# Patient Record
Sex: FEMALE | Race: OTHER | Employment: UNEMPLOYED | URBAN - METROPOLITAN AREA
[De-identification: names, ages, dates, MRNs, and addresses within clinical notes are randomized per-mention and may not be internally consistent; named-entity substitution may affect disease eponyms.]

---

## 2017-05-22 ENCOUNTER — TELEPHONE (OUTPATIENT)
Dept: FAMILY MEDICINE CLINIC | Facility: CLINIC | Age: 37
End: 2017-05-22

## 2017-05-22 NOTE — TELEPHONE ENCOUNTER
Patient is calling in she wants to become a new patient and would like to see Dr. Shana Esqueda, I explained to her that he is not taking new patients. She stated that he was her children's PCP before she found a pediatrician.   She is scheduled with Dr. Alvarado Chen on

## 2017-05-25 ENCOUNTER — OFFICE VISIT (OUTPATIENT)
Dept: FAMILY MEDICINE CLINIC | Facility: CLINIC | Age: 37
End: 2017-05-25

## 2017-05-25 ENCOUNTER — LAB ENCOUNTER (OUTPATIENT)
Dept: LAB | Age: 37
End: 2017-05-25
Attending: FAMILY MEDICINE
Payer: COMMERCIAL

## 2017-05-25 VITALS
HEART RATE: 64 BPM | HEIGHT: 61 IN | RESPIRATION RATE: 14 BRPM | DIASTOLIC BLOOD PRESSURE: 60 MMHG | WEIGHT: 109.81 LBS | BODY MASS INDEX: 20.73 KG/M2 | SYSTOLIC BLOOD PRESSURE: 94 MMHG

## 2017-05-25 DIAGNOSIS — R53.82 CHRONIC FATIGUE: Primary | ICD-10-CM

## 2017-05-25 DIAGNOSIS — R23.8 EASY BRUISING: ICD-10-CM

## 2017-05-25 DIAGNOSIS — Z86.39 HISTORY OF HYPOTHYROIDISM: ICD-10-CM

## 2017-05-25 DIAGNOSIS — M25.562 LATERAL KNEE PAIN, LEFT: ICD-10-CM

## 2017-05-25 DIAGNOSIS — E55.9 VITAMIN D DEFICIENCY: ICD-10-CM

## 2017-05-25 DIAGNOSIS — M54.50 CHRONIC BILATERAL LOW BACK PAIN WITHOUT SCIATICA: ICD-10-CM

## 2017-05-25 DIAGNOSIS — Z83.79 FAMILY HISTORY OF EARLY LIVER DISEASE: ICD-10-CM

## 2017-05-25 DIAGNOSIS — G89.29 CHRONIC BILATERAL LOW BACK PAIN WITHOUT SCIATICA: ICD-10-CM

## 2017-05-25 DIAGNOSIS — R53.82 CHRONIC FATIGUE: ICD-10-CM

## 2017-05-25 DIAGNOSIS — Z11.59 ENCOUNTER FOR HEPATITIS C SCREENING TEST FOR LOW RISK PATIENT: ICD-10-CM

## 2017-05-25 DIAGNOSIS — D18.01 CHERRY ANGIOMA: ICD-10-CM

## 2017-05-25 PROCEDURE — 80053 COMPREHEN METABOLIC PANEL: CPT

## 2017-05-25 PROCEDURE — 86803 HEPATITIS C AB TEST: CPT

## 2017-05-25 PROCEDURE — 84443 ASSAY THYROID STIM HORMONE: CPT

## 2017-05-25 PROCEDURE — 36415 COLL VENOUS BLD VENIPUNCTURE: CPT

## 2017-05-25 PROCEDURE — 99203 OFFICE O/P NEW LOW 30 MIN: CPT | Performed by: FAMILY MEDICINE

## 2017-05-25 PROCEDURE — 85025 COMPLETE CBC W/AUTO DIFF WBC: CPT

## 2017-05-25 PROCEDURE — 82306 VITAMIN D 25 HYDROXY: CPT

## 2017-05-25 RX ORDER — ERGOCALCIFEROL 1.25 MG/1
50000 CAPSULE ORAL WEEKLY
Qty: 12 CAPSULE | Refills: 0 | Status: SHIPPED | OUTPATIENT
Start: 2017-05-25 | End: 2018-08-13

## 2017-05-25 NOTE — PROGRESS NOTES
Chief Complaint:  Patient presents with:  Establish Care: New patient  Bruising: Pt notes bruising on legs for years, but it is getting worse. Moles: Notes red moles on abdomen. Musculoskeletal Problem: Pt states that left knee locks up.   Low Back Pain: •   2013     Postpartum hemorrhage & endometritis   • Other       LAPROMOTMY AFTER LAST        Family History   Problem Relation Age of Onset   • Cancer Father      Liver   • Diabetes Mother    • Hypertension Mother    • Kidney Disea Problem List Items Addressed This Visit     None      Visit Diagnoses     Chronic fatigue    -  Primary     Relevant Orders     VITAMIN D, 25-HYDROXY     ASSAY, THYROID STIM HORMONE     History of hypothyroidism         Relevant Orders     ASSAY, THYROID Chiropractic Referral - In [address] angioma  Reassured these are benign growths. Return in about 4 weeks (around 6/22/2017).     Katalina Saez DO  5/25/2017 10:25 AM  Family Medicine

## 2017-05-26 ENCOUNTER — TELEPHONE (OUTPATIENT)
Dept: FAMILY MEDICINE CLINIC | Facility: CLINIC | Age: 37
End: 2017-05-26

## 2017-05-26 NOTE — TELEPHONE ENCOUNTER
Notes Recorded by Wilder Galindo RN on 5/26/2017 at 3:00 PM  Results reviewed with Pt, script sent to [place]  Notes Recorded by Tresea Goltz, DO on 5/25/2017 at 10:17 PM  Please let patient know that I was happy to meet her!   Good news- labs overall lo

## 2017-05-27 RX ORDER — ERGOCALCIFEROL 1.25 MG/1
CAPSULE ORAL
Qty: 13 CAPSULE | Refills: 0 | OUTPATIENT
Start: 2017-05-27

## 2017-08-17 ENCOUNTER — OFFICE VISIT (OUTPATIENT)
Dept: OBGYN CLINIC | Facility: CLINIC | Age: 37
End: 2017-08-17

## 2017-08-17 VITALS
WEIGHT: 112 LBS | BODY MASS INDEX: 20.09 KG/M2 | SYSTOLIC BLOOD PRESSURE: 112 MMHG | HEART RATE: 80 BPM | HEIGHT: 62.5 IN | DIASTOLIC BLOOD PRESSURE: 52 MMHG

## 2017-08-17 DIAGNOSIS — N76.0 VAGINITIS AND VULVOVAGINITIS: Primary | ICD-10-CM

## 2017-08-17 DIAGNOSIS — R10.30 LOWER ABDOMINAL PAIN: ICD-10-CM

## 2017-08-17 PROCEDURE — 87480 CANDIDA DNA DIR PROBE: CPT | Performed by: OBSTETRICS & GYNECOLOGY

## 2017-08-17 PROCEDURE — 99213 OFFICE O/P EST LOW 20 MIN: CPT | Performed by: OBSTETRICS & GYNECOLOGY

## 2017-08-17 PROCEDURE — 87660 TRICHOMONAS VAGIN DIR PROBE: CPT | Performed by: OBSTETRICS & GYNECOLOGY

## 2017-08-17 PROCEDURE — 87510 GARDNER VAG DNA DIR PROBE: CPT | Performed by: OBSTETRICS & GYNECOLOGY

## 2017-08-17 NOTE — PROGRESS NOTES
Isis Sellers is a 40year old female  Patient's last menstrual period was 2017 (exact date). Patient presents with:  Gyn Problem: mucus brown discharge, slight itching, lower back pain X 20days  Other:  scar bumps   .  Patient notic activity: Not on file     Other Topics Concern    Caffeine Concern Yes    Comment: 1-2 cups coffee daily    Exercise No    Seat Belt Yes    Sleep Concern Yes    Comment: kids get up at night    Self-Exams No     Social History Narrative   None on file

## 2017-08-18 RX ORDER — METRONIDAZOLE 7.5 MG/G
1 GEL VAGINAL NIGHTLY
Qty: 1 TUBE | Refills: 0 | Status: SHIPPED | OUTPATIENT
Start: 2017-08-18 | End: 2018-08-13

## 2017-12-26 ENCOUNTER — OFFICE VISIT (OUTPATIENT)
Dept: SURGERY | Facility: CLINIC | Age: 37
End: 2017-12-26

## 2017-12-26 VITALS
SYSTOLIC BLOOD PRESSURE: 105 MMHG | WEIGHT: 110 LBS | OXYGEN SATURATION: 99 % | HEART RATE: 80 BPM | HEIGHT: 62.5 IN | BODY MASS INDEX: 19.74 KG/M2 | TEMPERATURE: 100 F | DIASTOLIC BLOOD PRESSURE: 72 MMHG

## 2017-12-26 DIAGNOSIS — M62.08 DIASTASIS RECTI: Primary | ICD-10-CM

## 2017-12-26 DIAGNOSIS — K42.9 UMBILICAL HERNIA WITHOUT OBSTRUCTION AND WITHOUT GANGRENE: ICD-10-CM

## 2017-12-26 PROCEDURE — 99243 OFF/OP CNSLTJ NEW/EST LOW 30: CPT | Performed by: SURGERY

## 2017-12-26 NOTE — CONSULTS
New Patient Consultation    This is the first visit for this 40year old female with rectus diastases    History of Present Illness:    The patient is a 40year old referred by Dr. Dillon Nathan for consideration for combined umbilical hernia repair and capsule Rfl: 0   PRENATAL MULTI +DHA (PNV WITH DHA) 27-0.8-228 MG Oral Cap Take 1 capsule by mouth daily. Disp:  Rfl:      No current facility-administered medications on file prior to visit.        Allergies:      Aspirin                 Swelling      Fami vomiting, dark/ bloody stools, diarrhea, constipation,  change in bowel habits, or abdominal pain.      Genitourinary:  The patient denies frequent urination, needing to get up at night to urinate, urinary hesitancy or retaining urine, painful urination, ur abdominal wall laxity is noted. Minimal flank lipodystrophy is detected. The patient was examined in the supine position. Rectus diastases measuring approximately 10 cm in length centered at the umbilicus is noted with a width of approximately 3-1/2 cm.

## 2018-04-25 ENCOUNTER — OFFICE VISIT (OUTPATIENT)
Dept: SURGERY | Facility: CLINIC | Age: 38
End: 2018-04-25

## 2018-04-25 VITALS
BODY MASS INDEX: 19.49 KG/M2 | DIASTOLIC BLOOD PRESSURE: 70 MMHG | TEMPERATURE: 98 F | HEIGHT: 63 IN | SYSTOLIC BLOOD PRESSURE: 106 MMHG | WEIGHT: 110 LBS | HEART RATE: 67 BPM

## 2018-04-25 DIAGNOSIS — K42.9 UMBILICAL HERNIA WITHOUT OBSTRUCTION AND WITHOUT GANGRENE: Primary | ICD-10-CM

## 2018-04-25 DIAGNOSIS — M54.50 CHRONIC BILATERAL LOW BACK PAIN WITHOUT SCIATICA: ICD-10-CM

## 2018-04-25 DIAGNOSIS — M62.08 DIASTASIS RECTI: ICD-10-CM

## 2018-04-25 DIAGNOSIS — G89.29 CHRONIC BILATERAL LOW BACK PAIN WITHOUT SCIATICA: ICD-10-CM

## 2018-04-25 PROCEDURE — 99243 OFF/OP CNSLTJ NEW/EST LOW 30: CPT | Performed by: SURGERY

## 2018-04-25 NOTE — PROGRESS NOTES
Follow Up Visit Note       Active Problems      1. Umbilical hernia without obstruction and without gangrene    2. Diastasis recti    3.  Chronic bilateral low back pain without sciatica          Chief Complaint   Patient presents with:  Hernia: est pt-umbi Problem Relation Age of Onset   • Cancer Father      Liver   • Diabetes Mother    • Hypertension Mother    • Kidney Disease Maternal Grandmother    • Hypertension Maternal Grandfather    • Diabetes Maternal Grandfather    • Hypertension Paternal Grandmot swelling. Gastrointestinal: Negative for abdominal distention, abdominal pain, anal bleeding, blood in stool, constipation, diarrhea, nausea and vomiting. Genitourinary: Negative for difficulty urinating, dysuria, frequency and urgency.    Musculoskelet Psychiatric: She has a normal mood and affect. Her speech is normal and behavior is normal. Judgment and thought content normal.   Nursing note and vitals reviewed.            Assessment   Umbilical hernia without obstruction and without gangrene  (primar

## 2018-04-30 ENCOUNTER — TELEPHONE (OUTPATIENT)
Dept: SURGERY | Facility: CLINIC | Age: 38
End: 2018-04-30

## 2018-04-30 NOTE — TELEPHONE ENCOUNTER
Pt is interested in having an abdominoplasty. Her insurance denied the procedure previously. She has new insurance now and would like us to try again. Patient is interested in obtaining a quote for the procedure in case it is denied.  Suggested she talk to

## 2018-05-07 ENCOUNTER — TELEPHONE (OUTPATIENT)
Dept: SURGERY | Facility: CLINIC | Age: 38
End: 2018-05-07

## 2018-06-26 ENCOUNTER — OFFICE VISIT (OUTPATIENT)
Dept: SURGERY | Facility: CLINIC | Age: 38
End: 2018-06-26

## 2018-06-26 ENCOUNTER — TELEPHONE (OUTPATIENT)
Dept: FAMILY MEDICINE CLINIC | Facility: CLINIC | Age: 38
End: 2018-06-26

## 2018-06-26 ENCOUNTER — MEDICAL CORRESPONDENCE (OUTPATIENT)
Dept: SURGERY | Facility: CLINIC | Age: 38
End: 2018-06-26

## 2018-06-26 VITALS — WEIGHT: 115.19 LBS | HEIGHT: 63 IN | BODY MASS INDEX: 20.41 KG/M2

## 2018-06-26 DIAGNOSIS — E55.9 VITAMIN D DEFICIENCY: ICD-10-CM

## 2018-06-26 DIAGNOSIS — M62.08 DIASTASIS RECTI: Primary | ICD-10-CM

## 2018-06-26 DIAGNOSIS — Z01.419 WELL WOMAN EXAM: Primary | ICD-10-CM

## 2018-06-26 DIAGNOSIS — K42.9 UMBILICAL HERNIA WITHOUT OBSTRUCTION AND WITHOUT GANGRENE: ICD-10-CM

## 2018-06-26 NOTE — PROGRESS NOTES
In creating the medical clearance request, the boxes did not stay marked when printed. Pt will need to have H & P for medical clearance for surgery. I marked the boxes after being printed.      Faxed EPIC request for medical clearance to Dr. Nury Valero office

## 2018-06-26 NOTE — PROGRESS NOTES
Informed consent for the procedure reviewed and signed by the patient in the office, all questions answered. The patient was provided a copy of the signed consent forms. Original copy witnessed and signed by this CMA and sent to be scanned into media.

## 2018-06-26 NOTE — TELEPHONE ENCOUNTER
Received fax from [address] [place] [address] for Abdominoplasty with Dr. Keturah Hurtado. History and Physical and Medical clearance are needed. Surgery date TBD. Paperwork in triage.

## 2018-06-26 NOTE — TELEPHONE ENCOUNTER
No labs were requested patient LOV 5/25/2017 with Dr Joseph Crawford needs to come in for physical and yearly labs

## 2018-06-26 NOTE — PROGRESS NOTES
Bridger Newton is a 41year old female who presents today for a follow-up. She to proceed with combined abdominoplasty and umbilical hernia repair Dr. Haley Wilson.       Physical Examination:   06/26/18  0956   Weight: 52.3 kg (115 lb 3.2 oz)   Height: 1.6 m numbness, hypertrophic scarring or keloid, swelling, scar visibility, as well as medical risks including DVT and PE discussed.   We reviewed the expected postoperative course including need for drains, activity limitation, abdominal binder, and suture remov

## 2018-06-28 ENCOUNTER — TELEPHONE (OUTPATIENT)
Dept: SURGERY | Facility: CLINIC | Age: 38
End: 2018-06-28

## 2018-06-28 DIAGNOSIS — K42.9 UMBILICAL HERNIA WITHOUT OBSTRUCTION OR GANGRENE: Primary | ICD-10-CM

## 2018-06-29 ENCOUNTER — TELEPHONE (OUTPATIENT)
Dept: SURGERY | Facility: CLINIC | Age: 38
End: 2018-06-29

## 2018-06-29 NOTE — TELEPHONE ENCOUNTER
Called patient to confirm date of surgery, 8/9/18. Patient wanted more options to make sure she was able to get a sitter for her kids. Offered 8/9 and 8/23. Patient to call back after talking to her . Surgery currently scheduled on 8/9.  Will resched

## 2018-07-05 ENCOUNTER — TELEPHONE (OUTPATIENT)
Dept: SURGERY | Facility: CLINIC | Age: 38
End: 2018-07-05

## 2018-07-05 NOTE — TELEPHONE ENCOUNTER
Patient called to rescheduled surgery. Patient would like to reschedule procedure to 8/23. Will follow up with Dr Tim Cortez office to see if she is available.

## 2018-08-10 LAB
ABSOLUTE BASOPHILS: 99 CELLS/UL (ref 0–200)
ABSOLUTE EOSINOPHILS: 119 CELLS/UL (ref 15–500)
ABSOLUTE LYMPHOCYTES: 2343 CELLS/UL (ref 850–3900)
ABSOLUTE MONOCYTES: 521 CELLS/UL (ref 200–950)
ABSOLUTE NEUTROPHILS: 3518 CELLS/UL (ref 1500–7800)
ALBUMIN/GLOBULIN RATIO: 1.4 (CALC) (ref 1–2.5)
ALBUMIN: 4.4 G/DL (ref 3.6–5.1)
ALKALINE PHOSPHATASE: 51 U/L (ref 33–115)
ALT: 11 U/L (ref 6–29)
AST: 14 U/L (ref 10–30)
BASOPHILS: 1.5 %
BILIRUBIN, TOTAL: 0.4 MG/DL (ref 0.2–1.2)
BUN: 17 MG/DL (ref 7–25)
CALCIUM: 9.3 MG/DL (ref 8.6–10.2)
CARBON DIOXIDE: 26 MMOL/L (ref 20–32)
CHLORIDE: 104 MMOL/L (ref 98–110)
CHOL/HDLC RATIO: 3.9 (CALC)
CHOLESTEROL, TOTAL: 187 MG/DL
CREATININE: 0.63 MG/DL (ref 0.5–1.1)
EGFR IF AFRICN AM: 133 ML/MIN/1.73M2
EGFR IF NONAFRICN AM: 115 ML/MIN/1.73M2
EOSINOPHILS: 1.8 %
GLOBULIN: 3.2 G/DL (CALC) (ref 1.9–3.7)
GLUCOSE: 82 MG/DL (ref 65–99)
HDL CHOLESTEROL: 48 MG/DL
HEMATOCRIT: 38.9 % (ref 35–45)
HEMOGLOBIN: 12.8 G/DL (ref 11.7–15.5)
LDL-CHOLESTEROL: 120 MG/DL (CALC)
LYMPHOCYTES: 35.5 %
MCH: 27.4 PG (ref 27–33)
MCHC: 32.9 G/DL (ref 32–36)
MCV: 83.3 FL (ref 80–100)
MONOCYTES: 7.9 %
MPV: 9.6 FL (ref 7.5–12.5)
NEUTROPHILS: 53.3 %
NON-HDL CHOLESTEROL: 139 MG/DL (CALC)
PLATELET COUNT: 313 THOUSAND/UL (ref 140–400)
POTASSIUM: 4.3 MMOL/L (ref 3.5–5.3)
PROTEIN, TOTAL: 7.6 G/DL (ref 6.1–8.1)
RDW: 12.9 % (ref 11–15)
RED BLOOD CELL COUNT: 4.67 MILLION/UL (ref 3.8–5.1)
SODIUM: 138 MMOL/L (ref 135–146)
TRIGLYCERIDES: 86 MG/DL
VITAMIN D, 25-OH, TOTAL: 20 NG/ML (ref 30–100)
WHITE BLOOD CELL COUNT: 6.6 THOUSAND/UL (ref 3.8–10.8)

## 2018-08-13 ENCOUNTER — TELEPHONE (OUTPATIENT)
Dept: SURGERY | Facility: CLINIC | Age: 38
End: 2018-08-13

## 2018-08-13 ENCOUNTER — OFFICE VISIT (OUTPATIENT)
Dept: FAMILY MEDICINE CLINIC | Facility: CLINIC | Age: 38
End: 2018-08-13
Payer: COMMERCIAL

## 2018-08-13 VITALS
WEIGHT: 115 LBS | HEIGHT: 63 IN | TEMPERATURE: 99 F | HEART RATE: 70 BPM | DIASTOLIC BLOOD PRESSURE: 58 MMHG | BODY MASS INDEX: 20.37 KG/M2 | RESPIRATION RATE: 16 BRPM | SYSTOLIC BLOOD PRESSURE: 106 MMHG

## 2018-08-13 DIAGNOSIS — M62.08 DIASTASIS RECTI: Primary | ICD-10-CM

## 2018-08-13 DIAGNOSIS — Z01.818 PREOP EXAMINATION: ICD-10-CM

## 2018-08-13 DIAGNOSIS — K42.9 UMBILICAL HERNIA WITHOUT OBSTRUCTION AND WITHOUT GANGRENE: ICD-10-CM

## 2018-08-13 PROCEDURE — 99243 OFF/OP CNSLTJ NEW/EST LOW 30: CPT | Performed by: FAMILY MEDICINE

## 2018-08-17 RX ORDER — CEFAZOLIN SODIUM/WATER 2 G/20 ML
2 SYRINGE (ML) INTRAVENOUS ONCE
Status: CANCELLED | OUTPATIENT
Start: 2018-08-17 | End: 2018-08-17

## 2018-08-17 RX ORDER — ACETAMINOPHEN 160 MG
2000 TABLET,DISINTEGRATING ORAL DAILY
COMMUNITY
End: 2018-08-25

## 2018-08-23 ENCOUNTER — SURGERY (OUTPATIENT)
Age: 38
End: 2018-08-23

## 2018-08-23 ENCOUNTER — ANESTHESIA (OUTPATIENT)
Dept: SURGERY | Facility: HOSPITAL | Age: 38
End: 2018-08-23
Payer: COMMERCIAL

## 2018-08-23 ENCOUNTER — HOSPITAL ENCOUNTER (OUTPATIENT)
Facility: HOSPITAL | Age: 38
Setting detail: HOSPITAL OUTPATIENT SURGERY
Discharge: HOME | End: 2018-08-23
Attending: SURGERY | Admitting: SURGERY
Payer: COMMERCIAL

## 2018-08-23 ENCOUNTER — ANESTHESIA EVENT (OUTPATIENT)
Dept: SURGERY | Facility: HOSPITAL | Age: 38
End: 2018-08-23
Payer: COMMERCIAL

## 2018-08-23 VITALS
OXYGEN SATURATION: 100 % | WEIGHT: 115 LBS | HEIGHT: 62 IN | RESPIRATION RATE: 18 BRPM | BODY MASS INDEX: 21.16 KG/M2 | SYSTOLIC BLOOD PRESSURE: 99 MMHG | TEMPERATURE: 97 F | DIASTOLIC BLOOD PRESSURE: 51 MMHG | HEART RATE: 60 BPM

## 2018-08-23 DIAGNOSIS — K42.9 UMBILICAL HERNIA WITHOUT OBSTRUCTION OR GANGRENE: ICD-10-CM

## 2018-08-23 LAB
EXPIRATION DATE: 2019
POCT LOT NUMBER: NORMAL
POCT URINE PREGNANCY: NEGATIVE

## 2018-08-23 PROCEDURE — 0WQF0ZZ REPAIR ABDOMINAL WALL, OPEN APPROACH: ICD-10-PCS | Performed by: SURGERY

## 2018-08-23 PROCEDURE — 81025 URINE PREGNANCY TEST: CPT | Performed by: SURGERY

## 2018-08-23 PROCEDURE — 0J080ZZ ALTERATION OF ABDOMEN SUBCUTANEOUS TISSUE AND FASCIA, OPEN APPROACH: ICD-10-PCS | Performed by: SURGERY

## 2018-08-23 RX ORDER — SODIUM CHLORIDE, SODIUM LACTATE, POTASSIUM CHLORIDE, CALCIUM CHLORIDE 600; 310; 30; 20 MG/100ML; MG/100ML; MG/100ML; MG/100ML
INJECTION, SOLUTION INTRAVENOUS CONTINUOUS
Status: DISCONTINUED | OUTPATIENT
Start: 2018-08-23 | End: 2018-08-23

## 2018-08-23 RX ORDER — DEXAMETHASONE SODIUM PHOSPHATE 4 MG/ML
4 VIAL (ML) INJECTION AS NEEDED
Status: DISCONTINUED | OUTPATIENT
Start: 2018-08-23 | End: 2018-08-23

## 2018-08-23 RX ORDER — NALOXONE HYDROCHLORIDE 0.4 MG/ML
80 INJECTION, SOLUTION INTRAMUSCULAR; INTRAVENOUS; SUBCUTANEOUS AS NEEDED
Status: DISCONTINUED | OUTPATIENT
Start: 2018-08-23 | End: 2018-08-23

## 2018-08-23 RX ORDER — MIDAZOLAM HYDROCHLORIDE 1 MG/ML
1 INJECTION INTRAMUSCULAR; INTRAVENOUS EVERY 5 MIN PRN
Status: DISCONTINUED | OUTPATIENT
Start: 2018-08-23 | End: 2018-08-23

## 2018-08-23 RX ORDER — BUPIVACAINE HYDROCHLORIDE AND EPINEPHRINE 5; 5 MG/ML; UG/ML
INJECTION, SOLUTION EPIDURAL; INTRACAUDAL; PERINEURAL AS NEEDED
Status: DISCONTINUED | OUTPATIENT
Start: 2018-08-23 | End: 2018-08-23 | Stop reason: HOSPADM

## 2018-08-23 RX ORDER — ONDANSETRON 2 MG/ML
4 INJECTION INTRAMUSCULAR; INTRAVENOUS AS NEEDED
Status: DISCONTINUED | OUTPATIENT
Start: 2018-08-23 | End: 2018-08-23

## 2018-08-23 RX ORDER — ACETAMINOPHEN 500 MG
1000 TABLET ORAL ONCE AS NEEDED
Status: DISCONTINUED | OUTPATIENT
Start: 2018-08-23 | End: 2018-08-23

## 2018-08-23 RX ORDER — MORPHINE SULFATE 4 MG/ML
2 INJECTION, SOLUTION INTRAMUSCULAR; INTRAVENOUS EVERY 5 MIN PRN
Status: DISCONTINUED | OUTPATIENT
Start: 2018-08-23 | End: 2018-08-23

## 2018-08-23 RX ORDER — TRAMADOL HYDROCHLORIDE 50 MG/1
TABLET ORAL EVERY 4 HOURS PRN
Qty: 40 TABLET | Refills: 0 | Status: ON HOLD | OUTPATIENT
Start: 2018-08-23 | End: 2018-08-27

## 2018-08-23 RX ORDER — METOCLOPRAMIDE HYDROCHLORIDE 5 MG/ML
10 INJECTION INTRAMUSCULAR; INTRAVENOUS AS NEEDED
Status: DISCONTINUED | OUTPATIENT
Start: 2018-08-23 | End: 2018-08-23

## 2018-08-23 RX ORDER — ENOXAPARIN SODIUM 100 MG/ML
40 INJECTION SUBCUTANEOUS DAILY
Qty: 10 SYRINGE | Refills: 0 | Status: ON HOLD | OUTPATIENT
Start: 2018-08-23 | End: 2018-08-27

## 2018-08-23 RX ORDER — ACETAMINOPHEN 500 MG
1000 TABLET ORAL ONCE
Status: DISCONTINUED | OUTPATIENT
Start: 2018-08-23 | End: 2018-08-23 | Stop reason: HOSPADM

## 2018-08-23 RX ORDER — DOCUSATE SODIUM 100 MG/1
100 CAPSULE, LIQUID FILLED ORAL 2 TIMES DAILY
Qty: 30 CAPSULE | Refills: 0 | Status: SHIPPED | OUTPATIENT
Start: 2018-08-23 | End: 2019-03-05 | Stop reason: ALTCHOICE

## 2018-08-23 RX ORDER — MORPHINE SULFATE 4 MG/ML
INJECTION, SOLUTION INTRAMUSCULAR; INTRAVENOUS
Status: COMPLETED
Start: 2018-08-23 | End: 2018-08-23

## 2018-08-23 RX ORDER — HYDROCODONE BITARTRATE AND ACETAMINOPHEN 5; 325 MG/1; MG/1
2 TABLET ORAL AS NEEDED
Status: DISCONTINUED | OUTPATIENT
Start: 2018-08-23 | End: 2018-08-23

## 2018-08-23 RX ORDER — ACETAMINOPHEN 500 MG
1000 TABLET ORAL EVERY 6 HOURS PRN
Status: ON HOLD | COMMUNITY
End: 2018-08-23

## 2018-08-23 RX ORDER — HYDROCODONE BITARTRATE AND ACETAMINOPHEN 5; 325 MG/1; MG/1
1 TABLET ORAL AS NEEDED
Status: DISCONTINUED | OUTPATIENT
Start: 2018-08-23 | End: 2018-08-23

## 2018-08-23 RX ORDER — HEPARIN SODIUM 5000 [USP'U]/ML
5000 INJECTION, SOLUTION INTRAVENOUS; SUBCUTANEOUS ONCE
Status: COMPLETED | OUTPATIENT
Start: 2018-08-23 | End: 2018-08-23

## 2018-08-23 RX ORDER — LABETALOL HYDROCHLORIDE 5 MG/ML
5 INJECTION, SOLUTION INTRAVENOUS EVERY 5 MIN PRN
Status: DISCONTINUED | OUTPATIENT
Start: 2018-08-23 | End: 2018-08-23

## 2018-08-23 RX ORDER — CEFAZOLIN SODIUM/WATER 2 G/20 ML
2 SYRINGE (ML) INTRAVENOUS ONCE
Status: COMPLETED | OUTPATIENT
Start: 2018-08-23 | End: 2018-08-23

## 2018-08-23 RX ORDER — ONDANSETRON 4 MG/1
4 TABLET, FILM COATED ORAL EVERY 8 HOURS PRN
Qty: 20 TABLET | Refills: 0 | Status: SHIPPED | OUTPATIENT
Start: 2018-08-23 | End: 2019-03-05 | Stop reason: ALTCHOICE

## 2018-08-23 NOTE — H&P
History & Physical Examination    Patient Name: Ady Hernandez  MRN: [id number]  CSN: [id number]  YOB: 1980    Diagnosis: Umbilical hernia    Present Illness:  The patient is a 36-year-old female seen at the request of her primary care aydin       Comment: Postpartum hemorrhage & endometritis  No date: OTHER      Comment: LAPROMOTMY AFTER LAST   Family History   Problem Relation Age of Onset   • Cancer Father      Liver   • Diabetes Mother    • Hypertension Mother    • Kidney

## 2018-08-23 NOTE — ANESTHESIA POSTPROCEDURE EVALUATION
[address] Patient Status:  Hospital Outpatient Surgery   Age/Gender 41year old female MRN [id number]   Location [address] [facility] Attending Hardy Joseph MD   [facility] Day # 0 [facility] [address] [address], DO

## 2018-08-23 NOTE — BRIEF OP NOTE
Pre-Operative Diagnosis: Umbilical hernia without obstruction or gangrene [K42.9]  DIASTASIS RECTI       Post-Operative Diagnosis: Umbilical hernia without obstruction or gangrene [I64. 9]DIASTASIS RECTI        Procedure Performed:   Procedure(s):   UMBILICA

## 2018-08-23 NOTE — ANESTHESIA PREPROCEDURE EVALUATION
PRE-OP EVALUATION    Patient Name: Rony Vázquez    Pre-op Diagnosis: Umbilical hernia without obstruction or gangrene [K42.9]  DIASTASIS RECTI      Procedure(s):   UMBILICAL HERNIORRHAPHY (ALEXEI)  ABDOMINOPLASTY (YAQUELIN)      Surgeon(s) and Role:  Panel normal.         Dental    No notable dental history.          Pulmonary    Pulmonary exam normal.                 Other findings            ASA: 2   Plan: general             Plan/risks discussed with: patient                Present on Admission:  **None**

## 2018-08-23 NOTE — OPERATIVE REPORT
PREOPERATIVE DIAGNOSIS: Incarcerated umbilical hernia, diastases recti.    POSTOPERATIVE DIAGNOSIS: Incarcerated umbilical hernia, diastases recti.    PROCEDURE PERFORMED: Umbilical hernia repair (no mesh used)  SURGEON:  Samm Phillips MD  ANESTHESI

## 2018-08-23 NOTE — PROGRESS NOTES
No change in H&P from 8/13. We reviewed the plan for combined abdominoplasty/umbilical hernia repair by Dr. Tenzin Moran. The nature and technique of abdominoplasty was described to the patient.   We reviewed the components of abdominoplasty including fascial pli

## 2018-08-24 NOTE — OPERATIVE REPORT
[facility]    PATIENT'S NAME: Dalia Goodson   ATTENDING PHYSICIAN: Jennifer Lopez M.D. OPERATING PHYSICIAN: Vazquez Marina M.D.    PATIENT ACCOUNT#:   [de-identified]    LOCATION:  PREOPASCC  PRE ASCC 1 EDWP 10  MEDICAL RECORD #:   [id number] successful induction of general anesthesia and endotracheal intubation. The arms were placed abducted on foam padded arm boards and loosely secured with Kerlix. The chest and abdomen were then prepped and draped sterilely.   The lower incision was created excess was excised with a scalpel and electrocautery. Some sub-Alber fascia was directly excised with electrocautery. Two 15-[place] Tino drains were exited through the lateral aspects of the incision and sutured to skin with 3-0 nylon suture.   The woun

## 2018-08-25 ENCOUNTER — APPOINTMENT (OUTPATIENT)
Dept: CT IMAGING | Facility: HOSPITAL | Age: 38
DRG: 093 | End: 2018-08-25
Attending: EMERGENCY MEDICINE
Payer: COMMERCIAL

## 2018-08-25 ENCOUNTER — APPOINTMENT (OUTPATIENT)
Dept: MRI IMAGING | Facility: HOSPITAL | Age: 38
DRG: 093 | End: 2018-08-25
Attending: Other
Payer: COMMERCIAL

## 2018-08-25 ENCOUNTER — HOSPITAL ENCOUNTER (INPATIENT)
Facility: HOSPITAL | Age: 38
LOS: 2 days | Discharge: HOME | DRG: 093 | End: 2018-08-27
Attending: EMERGENCY MEDICINE | Admitting: HOSPITALIST
Payer: COMMERCIAL

## 2018-08-25 DIAGNOSIS — D32.9 MENINGIOMA (HCC): ICD-10-CM

## 2018-08-25 DIAGNOSIS — R20.0 NUMBNESS AND TINGLING: Primary | ICD-10-CM

## 2018-08-25 DIAGNOSIS — I63.9 CEREBROVASCULAR ACCIDENT (CVA), UNSPECIFIED MECHANISM (HCC): ICD-10-CM

## 2018-08-25 DIAGNOSIS — R20.2 NUMBNESS AND TINGLING: Primary | ICD-10-CM

## 2018-08-25 LAB
ALBUMIN SERPL-MCNC: 3 G/DL (ref 3.5–4.8)
ALBUMIN SERPL-MCNC: 3.5 G/DL (ref 3.5–4.8)
ALBUMIN/GLOB SERPL: 0.8 {RATIO} (ref 1–2)
ALBUMIN/GLOB SERPL: 0.9 {RATIO} (ref 1–2)
ALP LIVER SERPL-CCNC: 54 U/L (ref 37–98)
ALP LIVER SERPL-CCNC: 65 U/L (ref 37–98)
ALT SERPL-CCNC: 15 U/L (ref 14–54)
ALT SERPL-CCNC: 17 U/L (ref 14–54)
ANION GAP SERPL CALC-SCNC: 5 MMOL/L (ref 0–18)
ANION GAP SERPL CALC-SCNC: 5 MMOL/L (ref 0–18)
APTT PPP: 39.1 SECONDS (ref 26.1–34.6)
AST SERPL-CCNC: 13 U/L (ref 15–41)
AST SERPL-CCNC: 15 U/L (ref 15–41)
BASOPHILS # BLD AUTO: 0.07 X10(3) UL (ref 0–0.1)
BASOPHILS # BLD AUTO: 0.07 X10(3) UL (ref 0–0.1)
BASOPHILS NFR BLD AUTO: 0.5 %
BASOPHILS NFR BLD AUTO: 0.7 %
BILIRUB SERPL-MCNC: 0.2 MG/DL (ref 0.1–2)
BILIRUB SERPL-MCNC: 0.3 MG/DL (ref 0.1–2)
BILIRUB UR QL STRIP.AUTO: NEGATIVE
BUN BLD-MCNC: 5 MG/DL (ref 8–20)
BUN BLD-MCNC: 7 MG/DL (ref 8–20)
BUN/CREAT SERPL: 7.7 (ref 10–20)
BUN/CREAT SERPL: 9.7 (ref 10–20)
CALCIUM BLD-MCNC: 7.6 MG/DL (ref 8.3–10.3)
CALCIUM BLD-MCNC: 8.7 MG/DL (ref 8.3–10.3)
CHLORIDE SERPL-SCNC: 106 MMOL/L (ref 101–111)
CHLORIDE SERPL-SCNC: 112 MMOL/L (ref 101–111)
CLARITY UR REFRACT.AUTO: CLEAR
CO2 SERPL-SCNC: 26 MMOL/L (ref 22–32)
CO2 SERPL-SCNC: 29 MMOL/L (ref 22–32)
COLOR UR AUTO: COLORLESS
CREAT BLD-MCNC: 0.65 MG/DL (ref 0.55–1.02)
CREAT BLD-MCNC: 0.72 MG/DL (ref 0.55–1.02)
EOSINOPHIL # BLD AUTO: 0.3 X10(3) UL (ref 0–0.3)
EOSINOPHIL # BLD AUTO: 0.3 X10(3) UL (ref 0–0.3)
EOSINOPHIL NFR BLD AUTO: 2.3 %
EOSINOPHIL NFR BLD AUTO: 3 %
ERYTHROCYTE [DISTWIDTH] IN BLOOD BY AUTOMATED COUNT: 13.4 % (ref 11.5–16)
ERYTHROCYTE [DISTWIDTH] IN BLOOD BY AUTOMATED COUNT: 13.6 % (ref 11.5–16)
GLOBULIN PLAS-MCNC: 3.5 G/DL (ref 2.5–4)
GLOBULIN PLAS-MCNC: 4.2 G/DL (ref 2.5–4)
GLUCOSE BLD-MCNC: 91 MG/DL (ref 70–99)
GLUCOSE BLD-MCNC: 98 MG/DL (ref 70–99)
GLUCOSE UR STRIP.AUTO-MCNC: NEGATIVE MG/DL
HCT VFR BLD AUTO: 32.5 % (ref 34–50)
HCT VFR BLD AUTO: 36.7 % (ref 34–50)
HGB BLD-MCNC: 10.4 G/DL (ref 12–16)
HGB BLD-MCNC: 11.9 G/DL (ref 12–16)
IMMATURE GRANULOCYTE COUNT: 0.02 X10(3) UL (ref 0–1)
IMMATURE GRANULOCYTE COUNT: 0.05 X10(3) UL (ref 0–1)
IMMATURE GRANULOCYTE RATIO %: 0.2 %
IMMATURE GRANULOCYTE RATIO %: 0.4 %
INR BLD: 0.98 (ref 0.9–1.1)
INR BLD: 1.01 (ref 0.9–1.1)
KETONES UR STRIP.AUTO-MCNC: NEGATIVE MG/DL
LACTIC ACID: 0.8 MMOL/L (ref 0.5–2)
LEUKOCYTE ESTERASE UR QL STRIP.AUTO: NEGATIVE
LYMPHOCYTES # BLD AUTO: 2.52 X10(3) UL (ref 0.9–4)
LYMPHOCYTES # BLD AUTO: 2.68 X10(3) UL (ref 0.9–4)
LYMPHOCYTES NFR BLD AUTO: 19.3 %
LYMPHOCYTES NFR BLD AUTO: 26.4 %
M PROTEIN MFR SERPL ELPH: 6.5 G/DL (ref 6.1–8.3)
M PROTEIN MFR SERPL ELPH: 7.7 G/DL (ref 6.1–8.3)
MCH RBC QN AUTO: 27.9 PG (ref 27–33.2)
MCH RBC QN AUTO: 28.1 PG (ref 27–33.2)
MCHC RBC AUTO-ENTMCNC: 32 G/DL (ref 31–37)
MCHC RBC AUTO-ENTMCNC: 32.4 G/DL (ref 31–37)
MCV RBC AUTO: 86.2 FL (ref 81–100)
MCV RBC AUTO: 87.8 FL (ref 81–100)
MONOCYTES # BLD AUTO: 0.92 X10(3) UL (ref 0.1–1)
MONOCYTES # BLD AUTO: 1.05 X10(3) UL (ref 0.1–1)
MONOCYTES NFR BLD AUTO: 8.1 %
MONOCYTES NFR BLD AUTO: 9.1 %
NEUTROPHIL ABS PRELIM: 6.16 X10 (3) UL (ref 1.3–6.7)
NEUTROPHIL ABS PRELIM: 9.05 X10 (3) UL (ref 1.3–6.7)
NEUTROPHILS # BLD AUTO: 6.16 X10(3) UL (ref 1.3–6.7)
NEUTROPHILS # BLD AUTO: 9.05 X10(3) UL (ref 1.3–6.7)
NEUTROPHILS NFR BLD AUTO: 60.6 %
NEUTROPHILS NFR BLD AUTO: 69.4 %
NITRITE UR QL STRIP.AUTO: NEGATIVE
OSMOLALITY SERPL CALC.SUM OF ELEC: 288 MOSM/KG (ref 275–295)
OSMOLALITY SERPL CALC.SUM OF ELEC: 293 MOSM/KG (ref 275–295)
PH UR STRIP.AUTO: 7 [PH] (ref 4.5–8)
PLATELET # BLD AUTO: 233 10(3)UL (ref 150–450)
PLATELET # BLD AUTO: 262 10(3)UL (ref 150–450)
POTASSIUM SERPL-SCNC: 3.4 MMOL/L (ref 3.6–5.1)
POTASSIUM SERPL-SCNC: 3.6 MMOL/L (ref 3.6–5.1)
PROT UR STRIP.AUTO-MCNC: NEGATIVE MG/DL
PSA SERPL DL<=0.01 NG/ML-MCNC: 13.4 SECONDS (ref 12.4–14.7)
PSA SERPL DL<=0.01 NG/ML-MCNC: 13.7 SECONDS (ref 12.4–14.7)
RBC # BLD AUTO: 3.7 X10(6)UL (ref 3.8–5.1)
RBC # BLD AUTO: 4.26 X10(6)UL (ref 3.8–5.1)
RED CELL DISTRIBUTION WIDTH-SD: 41.9 FL (ref 35.1–46.3)
RED CELL DISTRIBUTION WIDTH-SD: 43.9 FL (ref 35.1–46.3)
SODIUM SERPL-SCNC: 140 MMOL/L (ref 136–144)
SODIUM SERPL-SCNC: 143 MMOL/L (ref 136–144)
SP GR UR STRIP.AUTO: 1.02 (ref 1–1.03)
TROPONIN I SERPL-MCNC: <0.046 NG/ML (ref ?–0.05)
UROBILINOGEN UR STRIP.AUTO-MCNC: <2 MG/DL
WBC # BLD AUTO: 10.2 X10(3) UL (ref 4–13)
WBC # BLD AUTO: 13 X10(3) UL (ref 4–13)

## 2018-08-25 PROCEDURE — 71275 CT ANGIOGRAPHY CHEST: CPT | Performed by: EMERGENCY MEDICINE

## 2018-08-25 PROCEDURE — 99223 1ST HOSP IP/OBS HIGH 75: CPT | Performed by: HOSPITALIST

## 2018-08-25 PROCEDURE — 99254 IP/OBS CNSLTJ NEW/EST MOD 60: CPT | Performed by: OTHER

## 2018-08-25 PROCEDURE — 70498 CT ANGIOGRAPHY NECK: CPT | Performed by: EMERGENCY MEDICINE

## 2018-08-25 PROCEDURE — 74177 CT ABD & PELVIS W/CONTRAST: CPT | Performed by: EMERGENCY MEDICINE

## 2018-08-25 PROCEDURE — 70450 CT HEAD/BRAIN W/O DYE: CPT | Performed by: EMERGENCY MEDICINE

## 2018-08-25 PROCEDURE — 70496 CT ANGIOGRAPHY HEAD: CPT | Performed by: EMERGENCY MEDICINE

## 2018-08-25 PROCEDURE — 70551 MRI BRAIN STEM W/O DYE: CPT | Performed by: OTHER

## 2018-08-25 RX ORDER — SODIUM CHLORIDE 9 MG/ML
INJECTION, SOLUTION INTRAVENOUS CONTINUOUS
Status: DISCONTINUED | OUTPATIENT
Start: 2018-08-25 | End: 2018-08-26

## 2018-08-25 RX ORDER — ENOXAPARIN SODIUM 100 MG/ML
40 INJECTION SUBCUTANEOUS EVERY EVENING
Status: DISCONTINUED | OUTPATIENT
Start: 2018-08-25 | End: 2018-08-25

## 2018-08-25 RX ORDER — TRAMADOL HYDROCHLORIDE 50 MG/1
50 TABLET ORAL EVERY 8 HOURS PRN
Status: DISCONTINUED | OUTPATIENT
Start: 2018-08-25 | End: 2018-08-26

## 2018-08-25 RX ORDER — DOCUSATE SODIUM 100 MG/1
100 CAPSULE, LIQUID FILLED ORAL 2 TIMES DAILY
Status: DISCONTINUED | OUTPATIENT
Start: 2018-08-25 | End: 2018-08-27

## 2018-08-25 NOTE — ED NOTES
Patient is resting comfortably. NAD stating she feels better  at bs. Numbness and tingling decreasing to left side.  Report to titi DRIVER

## 2018-08-25 NOTE — CONSULTS
Neurology H&P    Ady Hernandez Patient Status:  Emergency    1980 MRN [id number]   Location [address] [facility] Attending WillElton MD   Hosp Day # 0 PCP Charlie Juan DO     Subjective:  Ady Hernandez is a(n) 41 year ol Hypotension     Acute blood loss anemia     Anemia     Umbilical hernia     Diastasis recti     S/P       PMHx:  Past Medical History:   Diagnosis Date   • Endometritis 2013    postpartum   • Extrinsic asthma, unspecified     Pt reports mild as bruising    Objective/Physical Exam:    Vital Signs:  Blood pressure 99/68, pulse 68, temperature 98 °F (36.7 °C), resp. rate 17, height 62\", weight 114 lb, last menstrual period 08/09/2018, SpO2 98 %, currently breastfeeding.     Gen: Awake and in no appa attenuation within the left temporal lobe which was concerning for possible meningioma is not reproduced suggesting it represented an area of volume averaging. Assessment:   This is a 42 y/o female with acute onset subjective L face arm and leg numbness

## 2018-08-25 NOTE — PLAN OF CARE
Called to B9/BAlexa at 1325 for Code Stroke. Met patient in [place] on the way to [address] [place] [address]. Upon arrival found patient with NIH of 3: slightly slurred speech, decreased LOC, and numbness/tingling to left face/arm/leg. Last Known Normal at 1100.   Patient mercer

## 2018-08-25 NOTE — H&P
[facility]PITAL[facility]  History and Physical     Isis Sellers Patient Status:  Emergency    1980 MRN [id number]   Location [address] [facility] Attending Will, Ja Christianson MD   Hosp Day # 0 PCP Jannet Shepard DO     Chief Complaint: endometritis  No date: OTHER      Comment: LAPROMOTMY AFTER LAST   Social History:  reports that she has never smoked. She has never used smokeless tobacco. She reports that she does not drink alcohol or use drugs.   Family History:   Family Histor gallops. Equal pulses. Chest and Back: No tenderness or deformity. Abdomen: Soft, nontender, nondistended. Positive bowel sounds. No rebound or guarding. Neurologic: CNII-XII grossly intact. No focal neurological deficits.    Musculoskeletal: Moves all

## 2018-08-25 NOTE — ED PROVIDER NOTES
Patient Seen in: [facility] Emergency Department    History   Patient presents with:  Numbness Weakness (neurologic)    Stated Complaint: post op    HPI    This patient is a 67-year-old female status post abdominoplasty and umbilical hernia repair 2 systems are as noted in HPI. Constitutional and vital signs reviewed. All other systems reviewed and negative except as noted above.     Physical Exam   ED Triage Vitals [08/25/18 1250]  BP: 99/68  Pulse: 68  Resp: 17  Temp: 98 °F (36.7 °C)  Temp src: Ratio 9.7 (*)     Globulin  4.2 (*)     A/G Ratio 0.8 (*)     All other components within normal limits   PTT, ACTIVATED - Abnormal; Notable for the following:     PTT 39.1 (*)     All other components within normal limits   CBC W/ DIFFERENTIAL - Abnormal; normal in size. INTRACRANIAL:  There are no abnormal extraaxial fluid collections. There is no midline shift. There is a 1.0 x 0.9 cm increased attenuation mass involving the left temporal lobe probably a dural-based meningioma.   There is nothing spec postsurgical seroma, no air is seen within this collection at this time. Appropriate clinical correlation and followup is advised. Patient was evaluated by Dr. Felisha Mojica of neurology.   She is not a TPA candidate because she has had Lovenox last 2 da

## 2018-08-25 NOTE — ED NOTES
PROCEDURE:  CT MAXILLOFACIAL BONES WITHOUT CONTRAST



HISTORY:

trauma



COMPARISON:

None



TECHNIQUE:

Contiguous axial CT  images of the maxillofacial bones were obtained. 

Coronal and sagittal reformats were generated.



Radiation dose:



Total exam DLP = 869 mGy-cm.



This CT exam was performed using one or more of the following dose 

reduction techniques: Automated exposure control, adjustment of the 

mA and/or kV according to patient size, and/or use of iterative 

reconstruction technique.



FINDINGS:



NASAL BONES:

Unremarkable.



ORBITS:

Unremarkable.



PARANASAL SINUSES/ MASTOIDS:

Clear.



MAXILLA:

Unremarkable.



MANDIBLE/ TEMPOROMANDIBULAR JOINTS:

Unremarkable.



SKULL BASE:

Unremarkable.



TEMPORAL BONES:

Middle ears and mastoid grossly unremarkable.



OTHER FINDINGS:

Soft tissue swelling over the forehead



IMPRESSION:

No evidence of fracture Patient is resting comfortably. Not as drowsy as arrival. Pt is sitting up on stretcher. Ambulated with  to restroom without assistance from staff.

## 2018-08-25 NOTE — PROGRESS NOTES
Pt called earlier today with c/o left upper/lower extremity numbness, difficulty breathing. Pt instructed to present to ED.  Imaging showed no evidence of neurologic abnormality, Chest CTA showed no evidence of pulmonary embolism, abdominal CT showed some s

## 2018-08-26 LAB
ANION GAP SERPL CALC-SCNC: 3 MMOL/L (ref 0–18)
ATRIAL RATE: 58 BPM
BASOPHILS # BLD AUTO: 0.05 X10(3) UL (ref 0–0.1)
BASOPHILS NFR BLD AUTO: 0.5 %
BUN BLD-MCNC: 5 MG/DL (ref 8–20)
BUN/CREAT SERPL: 11.4 (ref 10–20)
CALCIUM BLD-MCNC: 7.9 MG/DL (ref 8.3–10.3)
CHLORIDE SERPL-SCNC: 110 MMOL/L (ref 101–111)
CO2 SERPL-SCNC: 28 MMOL/L (ref 22–32)
CREAT BLD-MCNC: 0.44 MG/DL (ref 0.55–1.02)
EOSINOPHIL # BLD AUTO: 0.24 X10(3) UL (ref 0–0.3)
EOSINOPHIL NFR BLD AUTO: 2.4 %
ERYTHROCYTE [DISTWIDTH] IN BLOOD BY AUTOMATED COUNT: 13.5 % (ref 11.5–16)
GLUCOSE BLD-MCNC: 96 MG/DL (ref 70–99)
HAV IGM SER QL: 1.8 MG/DL (ref 1.8–2.5)
HCT VFR BLD AUTO: 31.2 % (ref 34–50)
HGB BLD-MCNC: 9.9 G/DL (ref 12–16)
IMMATURE GRANULOCYTE COUNT: 0.03 X10(3) UL (ref 0–1)
IMMATURE GRANULOCYTE RATIO %: 0.3 %
LYMPHOCYTES # BLD AUTO: 2.53 X10(3) UL (ref 0.9–4)
LYMPHOCYTES NFR BLD AUTO: 25.6 %
MCH RBC QN AUTO: 27.7 PG (ref 27–33.2)
MCHC RBC AUTO-ENTMCNC: 31.7 G/DL (ref 31–37)
MCV RBC AUTO: 87.4 FL (ref 81–100)
MONOCYTES # BLD AUTO: 0.86 X10(3) UL (ref 0.1–1)
MONOCYTES NFR BLD AUTO: 8.7 %
NEUTROPHIL ABS PRELIM: 6.18 X10 (3) UL (ref 1.3–6.7)
NEUTROPHILS # BLD AUTO: 6.18 X10(3) UL (ref 1.3–6.7)
NEUTROPHILS NFR BLD AUTO: 62.5 %
OSMOLALITY SERPL CALC.SUM OF ELEC: 289 MOSM/KG (ref 275–295)
P AXIS: 72 DEGREES
P-R INTERVAL: 126 MS
PHOSPHATE SERPL-MCNC: 2.6 MG/DL (ref 2.5–4.9)
PLATELET # BLD AUTO: 219 10(3)UL (ref 150–450)
POTASSIUM SERPL-SCNC: 3.5 MMOL/L (ref 3.6–5.1)
POTASSIUM SERPL-SCNC: 3.5 MMOL/L (ref 3.6–5.1)
Q-T INTERVAL: 408 MS
QRS DURATION: 72 MS
QTC CALCULATION (BEZET): 400 MS
R AXIS: 61 DEGREES
RBC # BLD AUTO: 3.57 X10(6)UL (ref 3.8–5.1)
RED CELL DISTRIBUTION WIDTH-SD: 42.5 FL (ref 35.1–46.3)
SODIUM SERPL-SCNC: 141 MMOL/L (ref 136–144)
T AXIS: 44 DEGREES
VENTRICULAR RATE: 58 BPM
WBC # BLD AUTO: 9.9 X10(3) UL (ref 4–13)

## 2018-08-26 PROCEDURE — 99232 SBSQ HOSP IP/OBS MODERATE 35: CPT | Performed by: HOSPITALIST

## 2018-08-26 PROCEDURE — 99232 SBSQ HOSP IP/OBS MODERATE 35: CPT | Performed by: OTHER

## 2018-08-26 PROCEDURE — 85025 COMPLETE CBC W/AUTO DIFF WBC: CPT | Performed by: SURGERY

## 2018-08-26 RX ORDER — ONDANSETRON 2 MG/ML
4 INJECTION INTRAMUSCULAR; INTRAVENOUS EVERY 6 HOURS PRN
Status: DISCONTINUED | OUTPATIENT
Start: 2018-08-26 | End: 2018-08-27

## 2018-08-26 RX ORDER — ACETAMINOPHEN 10 MG/ML
1000 INJECTION, SOLUTION INTRAVENOUS EVERY 6 HOURS PRN
Status: DISCONTINUED | OUTPATIENT
Start: 2018-08-26 | End: 2018-08-26

## 2018-08-26 RX ORDER — SODIUM CHLORIDE, SODIUM LACTATE, POTASSIUM CHLORIDE, CALCIUM CHLORIDE 600; 310; 30; 20 MG/100ML; MG/100ML; MG/100ML; MG/100ML
INJECTION, SOLUTION INTRAVENOUS CONTINUOUS
Status: DISCONTINUED | OUTPATIENT
Start: 2018-08-26 | End: 2018-08-27

## 2018-08-26 RX ORDER — ALBUMIN, HUMAN INJ 5% 5 %
250 SOLUTION INTRAVENOUS ONCE
Status: COMPLETED | OUTPATIENT
Start: 2018-08-26 | End: 2018-08-26

## 2018-08-26 RX ORDER — ACETAMINOPHEN 500 MG
1000 TABLET ORAL EVERY 6 HOURS PRN
Status: DISCONTINUED | OUTPATIENT
Start: 2018-08-26 | End: 2018-08-27

## 2018-08-26 RX ORDER — TRAMADOL HYDROCHLORIDE 50 MG/1
50 TABLET ORAL EVERY 12 HOURS PRN
Status: DISCONTINUED | OUTPATIENT
Start: 2018-08-26 | End: 2018-08-27

## 2018-08-26 RX ORDER — MAGNESIUM SULFATE HEPTAHYDRATE 40 MG/ML
2 INJECTION, SOLUTION INTRAVENOUS ONCE
Status: COMPLETED | OUTPATIENT
Start: 2018-08-26 | End: 2018-08-26

## 2018-08-26 NOTE — PLAN OF CARE
NEUROLOGICAL - ADULT    • Achieves stable or improved neurological status Completed          CARDIOVASCULAR - ADULT    • Maintains optimal cardiac output and hemodynamic stability Progressing        SKIN/TISSUE INTEGRITY - ADULT    • Skin integrity remains

## 2018-08-26 NOTE — PROGRESS NOTES
Admitted to ICU for hypotension overnight.    Feels better this am.   Ate breakfast. No nausea/vomiting  Numbness tingling resolved  MRI neg  Afeb /59, P 70, Uop 1200cc/LS  Awake and alert  Abdomen appropriately tender, non-distended, umbilicus viable,

## 2018-08-26 NOTE — PROGRESS NOTES
[facility] Pharmacy Note: Route Optimization for Acetaminophen (OFIRMEV)    Patient is currently on Acetaminophen (OFIRMEV) 1000 mg IV every 6 hours prn.    The patient meets the criteria to convert to the oral equivalent as established by the IV to Oral conversio

## 2018-08-26 NOTE — PLAN OF CARE
NURSING ADMISSION NOTE      Patient admitted via Cart  Oriented to room. Safety precautions initiated. Bed in low position. Call light in reach. Admission database completed.

## 2018-08-26 NOTE — PROGRESS NOTES
Patient admitted overnight for hypotension and strokelike symptoms. She was admitted to the ICU. Currently, she is hemodynamically stable. She has tolerated general diet. She is feeling good.   The dizziness and arm tingling, which brought her to the [facility]

## 2018-08-26 NOTE — PROGRESS NOTES
6417   Paged Dr. Deirdre Tatum  1L bolus given with fluctuating B/P readings. Pt. Stated she was becoming more increasingly dizzy, SOB, and feeling extremely weak. Dr. Deirdre Tatum notified and transfer to ICU ordered.     Notifed Dr. Preeti Nuñez of Transfer    Added

## 2018-08-26 NOTE — PROGRESS NOTES
[facility]  Progress Note     Bebeto Hawkins Patient Status:  Inpatient    1980 MRN [id number]   [facility] 4SW-A Attending Nikita Garcia MD   Hosp Day # 1 PCP Suresh Guillaume DO     Chief Complaint: hypotension  S:  Flakita Sorensen Epic.  Medications:   • magnesium sulfate  2 g Intravenous Once   • docusate sodium  100 mg Oral BID     ASSESSMENT / PLAN:   1. Left sided numbness- suspect excessive ultram dosing of 50mg- 2 tabs every 4 hours. CT and MRI brain wihtout acute event.    1.

## 2018-08-26 NOTE — CONSULTS
Patrick Hurley [address] [facility] [address] Note [facility]  Report of Consultation    Norma Magallanes Patient Status:  Inpatient    1980 MRN [id number]   [facility] 4SW-A Attending Yaw Paez Past Surgical History:  2013:       Comment: Postpartum hemorrhage & endometritis  No date: OTHER      Comment: LAPROMOTMY AFTER LAST   Family History   Problem Relation Age of Onset   • Cancer Father      Liver   • Diabetes Mother above/HPI  : Denies dysuria, hematuria, urinary retention. Behavioral/Psych: Normal affect, mood, speech. No AVH, No SI/HI    All other review of systems are negative.     Vital signs in last 24 hours:  Patient Vitals for the past 24 hrs:   BP Temp Temp data filed at 08/26/18 0800   Gross per 24 hour   Intake             1818 ml   Output             6215 ml   Net            -4397 ml       PHYSICAL EXAM  GEN: Appears alert, oriented, comfortable on RA.  No acute distress  PSYCH: Normal mood and affect, AAOX TEMP, DARIUS, SITE, DEV, THGB in the last 168 hours. Invalid input(s): COC90KGK, CHOB    Cultures:     No results found for this visit on 08/25/18.   Recent Labs   Lab  08/25/18   [address]  Clear   SPECGRAVITY  1.019   GLUUR  N addition there is a tubular fluid collection within this region most likely postsurgical seroma, no air is seen within this collection at this time. Appropriate clinical correlation and followup is advised.     Dictated by: Dano Resendiz MD on 8/25/201 a possible cause of he neurological symptoms    Thank you for the consultation. Will follow with you.     Desmond Quintanilla MD  [facility]  08/26/18  CCM time: 45 mins

## 2018-08-26 NOTE — PLAN OF CARE
CARDIOVASCULAR - ADULT    • Maintains optimal cardiac output and hemodynamic stability Not Progressing          NEUROLOGICAL - ADULT    • Achieves stable or improved neurological status Progressing        SKIN/TISSUE INTEGRITY - ADULT    • Skin integrity r

## 2018-08-26 NOTE — PROGRESS NOTES
[facility] Patient Status:  Inpatient    1980 MRN [id number]   [facility] 4SW-A Attending Emy Pino MD   Hosp Day # 1 PCP Alec Pepper DO     Critical Care Progress Note     S: Transferred from medical  General: alert, cooperative, oriented. No respiratory distress. Head: Normocephalic, without obvious abnormality, atraumatic. Throat: Lips, mucosa, and tongue normal.  No thrush noted. Lungs: CLear   Chest wall: No tenderness or deformity.    Heart:

## 2018-08-26 NOTE — PROGRESS NOTES
Neurology Progress Note    Kenrick Nelson Patient Status:  Inpatient    1980 MRN [id number]   [facility] 4SW-A Attending Chrystal Oppenheim, MD   Hosp Day # 1 PCP Sheri Dawkins DO     Subjective:  Pt was seen and examined in bed this a 2.6 08/26/2018   TROP <0.046 08/25/2018       Imaging:  CTH/CTA 8/25/18  CONCLUSION:    1. Unremarkable CTA of the head neck.   2. Previously described area of increased attenuation within the left temporal lobe which was concerning for possible meningioma

## 2018-08-27 VITALS
OXYGEN SATURATION: 98 % | BODY MASS INDEX: 20.98 KG/M2 | SYSTOLIC BLOOD PRESSURE: 116 MMHG | TEMPERATURE: 99 F | RESPIRATION RATE: 23 BRPM | DIASTOLIC BLOOD PRESSURE: 80 MMHG | HEART RATE: 69 BPM | WEIGHT: 114 LBS | HEIGHT: 62 IN

## 2018-08-27 LAB
ANION GAP SERPL CALC-SCNC: 5 MMOL/L (ref 0–18)
APTT PPP: 29.1 SECONDS (ref 26.1–34.6)
BASOPHILS # BLD AUTO: 0.07 X10(3) UL (ref 0–0.1)
BASOPHILS NFR BLD AUTO: 0.8 %
BUN BLD-MCNC: 6 MG/DL (ref 8–20)
BUN/CREAT SERPL: 10.2 (ref 10–20)
CALCIUM BLD-MCNC: 9.3 MG/DL (ref 8.3–10.3)
CHLORIDE SERPL-SCNC: 107 MMOL/L (ref 101–111)
CO2 SERPL-SCNC: 29 MMOL/L (ref 22–32)
CREAT BLD-MCNC: 0.59 MG/DL (ref 0.55–1.02)
EOSINOPHIL # BLD AUTO: 0.27 X10(3) UL (ref 0–0.3)
EOSINOPHIL NFR BLD AUTO: 3 %
ERYTHROCYTE [DISTWIDTH] IN BLOOD BY AUTOMATED COUNT: 13.1 % (ref 11.5–16)
GLUCOSE BLD-MCNC: 102 MG/DL (ref 70–99)
HAV IGM SER QL: 2 MG/DL (ref 1.8–2.5)
HCT VFR BLD AUTO: 33 % (ref 34–50)
HGB BLD-MCNC: 10.6 G/DL (ref 12–16)
IMMATURE GRANULOCYTE COUNT: 0.03 X10(3) UL (ref 0–1)
IMMATURE GRANULOCYTE RATIO %: 0.3 %
INR BLD: 0.98 (ref 0.9–1.1)
LYMPHOCYTES # BLD AUTO: 2.49 X10(3) UL (ref 0.9–4)
LYMPHOCYTES NFR BLD AUTO: 27.7 %
MCH RBC QN AUTO: 27.7 PG (ref 27–33.2)
MCHC RBC AUTO-ENTMCNC: 32.1 G/DL (ref 31–37)
MCV RBC AUTO: 86.4 FL (ref 81–100)
MONOCYTES # BLD AUTO: 0.7 X10(3) UL (ref 0.1–1)
MONOCYTES NFR BLD AUTO: 7.8 %
NEUTROPHIL ABS PRELIM: 5.44 X10 (3) UL (ref 1.3–6.7)
NEUTROPHILS # BLD AUTO: 5.44 X10(3) UL (ref 1.3–6.7)
NEUTROPHILS NFR BLD AUTO: 60.4 %
OSMOLALITY SERPL CALC.SUM OF ELEC: 290 MOSM/KG (ref 275–295)
PHOSPHATE SERPL-MCNC: 3.9 MG/DL (ref 2.5–4.9)
PLATELET # BLD AUTO: 247 10(3)UL (ref 150–450)
POTASSIUM SERPL-SCNC: 4.1 MMOL/L (ref 3.6–5.1)
POTASSIUM SERPL-SCNC: 4.1 MMOL/L (ref 3.6–5.1)
PSA SERPL DL<=0.01 NG/ML-MCNC: 13.4 SECONDS (ref 12.4–14.7)
RBC # BLD AUTO: 3.82 X10(6)UL (ref 3.8–5.1)
RED CELL DISTRIBUTION WIDTH-SD: 41.1 FL (ref 35.1–46.3)
SODIUM SERPL-SCNC: 141 MMOL/L (ref 136–144)
WBC # BLD AUTO: 9 X10(3) UL (ref 4–13)

## 2018-08-27 PROCEDURE — 99238 HOSP IP/OBS DSCHRG MGMT 30/<: CPT | Performed by: HOSPITALIST

## 2018-08-27 RX ORDER — SIMETHICONE 80 MG
80 TABLET,CHEWABLE ORAL 4 TIMES DAILY PRN
Status: DISCONTINUED | OUTPATIENT
Start: 2018-08-27 | End: 2018-08-27

## 2018-08-27 RX ORDER — SIMETHICONE 80 MG
160 TABLET,CHEWABLE ORAL 4 TIMES DAILY PRN
Status: DISCONTINUED | OUTPATIENT
Start: 2018-08-27 | End: 2018-08-27

## 2018-08-27 NOTE — PLAN OF CARE
CARDIOVASCULAR - ADULT    • Maintains optimal cardiac output and hemodynamic stability Adequate for Discharge        SKIN/TISSUE INTEGRITY - ADULT    • Skin integrity remains intact Adequate for Discharge    • Incision(s), wounds(s) or drain site(s) healin

## 2018-08-27 NOTE — PROGRESS NOTES
Ambulated.  Tolerating PO  AVSS  Hgb 10.6  Awake and alert  Abdomen soft, incisions c/d/i, umbilicus viable  A/P:  Doing well  Plan d/c home today  Home care instructions reviewed with pt an questions answered

## 2018-08-27 NOTE — PROGRESS NOTES
Went over DC instructions with pt and pts  at bedside. Instructed on home care and follow up appointments as well as medications for home. Pt and pts  denied any questions. All belongings packed up by pt . DC per wheelchair transport.

## 2018-08-27 NOTE — PROGRESS NOTES
[facility]  Progress Note    Boneta Flaming Patient Status:  Inpatient    1980 MRN [id number]   [facility] 4SW-A Attending Kylah Osman MD   [address] Day # 2 PCP Radha Mane DO     Subjective:  Boneta Fltao is a(n) 41 year o MCV  87.8  87.4  86.4   MCH  28.1  27.7  27.7   MCHC  32.0  31.7  32.1   RDW  13.6  13.5  13.1   NEPRELIM  6.16  6.18  5.44   WBC  10.2  9.9  9.0   PLT  233.0  219.0  247.0     Recent Labs   Lab  08/25/18 2014 08/26/18   0432  08/27/18   0434   GLU  9

## 2018-08-27 NOTE — DISCHARGE SUMMARY
[facility] HOSPITAL[facility]  DISCHARGE SUMMARY     [facility] Patient Status:  Inpatient    1980 MRN [id number]   [facility] 4SW-A Attending Nahomi Plaza MD   [facility] Day # 2 RIMA Warren DO     Date of Admission: 2018  Date of and did not feel she was a candidate. Symptoms have now significantly improved. Imaging thus far unremarkable. Brief Synopsis: BP continued to drop after admission with fluid boluses so t/f to ICU and close monitoring.  Neurological symptoms resolved h No rebound or guarding. Neurologic: No focal neurological deficits. Musculoskeletal: Moves all extremities. Extremities: No edema.   -----------------------------------------------------------------------------------------------  PATIENT DISCHARGE INSTR

## 2018-08-27 NOTE — PLAN OF CARE
CARDIOVASCULAR - ADULT    • Maintains optimal cardiac output and hemodynamic stability Progressing           Assumed pt care at 2000, A/O x 3. Pt denied and c/o dizziness, numbness or tingling.  Pt's VS remained stable during the night with BP in the

## 2018-08-27 NOTE — PAYOR COMM NOTE
--------------  Admit to inpatient Once (Order #[id number]) on 8/25/18    STATUS CHANGED TO OBSERVATION ON 8/27.      Place in observation Once (Order #[id number]) on 8/27/18    DISCHARGE REVIEW    Payor: [address] [place] [address] Drive #:  [phone]

## 2018-08-28 ENCOUNTER — OFFICE VISIT (OUTPATIENT)
Dept: SURGERY | Facility: CLINIC | Age: 38
End: 2018-08-28
Payer: COMMERCIAL

## 2018-08-28 DIAGNOSIS — M62.08 DIASTASIS RECTI: Primary | ICD-10-CM

## 2018-08-28 NOTE — PROGRESS NOTES
Sukumar Kirkland is a 41year old female who presents today for a follow-up with her . She denies fever and chills. She is eating and moving her bowels. Physical Examination:  Abdomen: Umbilicus is viable.   Steri-Strips are removed and replac

## 2018-08-29 ENCOUNTER — HOSPITAL ENCOUNTER (OUTPATIENT)
Dept: ULTRASOUND IMAGING | Age: 38
Discharge: HOME | End: 2018-08-29
Attending: SURGERY
Payer: COMMERCIAL

## 2018-08-29 ENCOUNTER — PATIENT OUTREACH (OUTPATIENT)
Dept: CASE MANAGEMENT | Age: 38
End: 2018-08-29

## 2018-08-29 ENCOUNTER — PATIENT MESSAGE (OUTPATIENT)
Dept: CASE MANAGEMENT | Age: 38
End: 2018-08-29

## 2018-08-29 ENCOUNTER — TELEPHONE (OUTPATIENT)
Dept: SURGERY | Facility: CLINIC | Age: 38
End: 2018-08-29

## 2018-08-29 DIAGNOSIS — M79.605 LEFT LEG PAIN: Primary | ICD-10-CM

## 2018-08-29 DIAGNOSIS — M79.605 LEFT LEG PAIN: ICD-10-CM

## 2018-08-29 PROCEDURE — 93971 EXTREMITY STUDY: CPT | Performed by: SURGERY

## 2018-08-29 NOTE — PROGRESS NOTES
Contacted pt for TCM, s/w pt, she stated she was leaving now to complete some testing therefore she will have to call back later.

## 2018-08-29 NOTE — TELEPHONE ENCOUNTER
I was transferred a call from radiology regarding this patient's negative LLE venous doppler-  I spoke with the patient and relayed results. She admits that she has been very mobile and may be over doing it.    I advised activity limitation and was able t

## 2018-08-29 NOTE — PROGRESS NOTES
I spoke with the patient who reports LLE calf \"muscle pain\" persistent since last night. She rates it 6/10 and denies warmth, redness or swelling to this area.    She would like Dr. Cash Gomez to know that she has been as mobile as possible since the discon

## 2018-09-07 ENCOUNTER — OFFICE VISIT (OUTPATIENT)
Dept: SURGERY | Facility: CLINIC | Age: 38
End: 2018-09-07
Payer: COMMERCIAL

## 2018-09-07 VITALS — TEMPERATURE: 98 F

## 2018-09-07 DIAGNOSIS — K42.9 UMBILICAL HERNIA WITHOUT OBSTRUCTION AND WITHOUT GANGRENE: Primary | ICD-10-CM

## 2018-09-07 NOTE — PROGRESS NOTES
This is a 64-year-old female that is 2 weeks status post her umbilical hernia repair Rufina Rojas) and abdominoplasty ([place]). She is here today for remaining drain evaluation. Since we saw her last she did go to the emergency room due to calf pain.   Venous

## 2018-09-18 ENCOUNTER — OFFICE VISIT (OUTPATIENT)
Dept: SURGERY | Facility: CLINIC | Age: 38
End: 2018-09-18
Payer: COMMERCIAL

## 2018-09-18 DIAGNOSIS — Z98.890 H/O HERNIA REPAIR: Primary | ICD-10-CM

## 2018-09-18 DIAGNOSIS — Z87.19 H/O HERNIA REPAIR: Primary | ICD-10-CM

## 2018-09-18 RX ORDER — DIPHENOXYLATE HYDROCHLORIDE AND ATROPINE SULFATE 2.5; .025 MG/1; MG/1
1 TABLET ORAL
COMMUNITY
End: 2019-03-05 | Stop reason: ALTCHOICE

## 2018-09-18 NOTE — PROGRESS NOTES
Isis Sellers is a 41year old female who presents today for a follow-up. She denies fever and chills. She denies nausea, vomiting, diarrhea or constipation. Physical Examination:  Abdomen: Flat. Incisions are clean dry and intact.   Umbilicus is w

## 2018-10-03 ENCOUNTER — TELEPHONE (OUTPATIENT)
Dept: SURGERY | Facility: CLINIC | Age: 38
End: 2018-10-03

## 2018-10-03 NOTE — TELEPHONE ENCOUNTER
Patient called stating that she can feel a stitch on one of her wound site. Patient also state that her drainage site is not healing. She denies and drainage or redness to the site. I have spoke to xena she is willing to see the patient on Friday.  Pily

## 2018-10-16 ENCOUNTER — OFFICE VISIT (OUTPATIENT)
Dept: SURGERY | Facility: CLINIC | Age: 38
End: 2018-10-16
Payer: COMMERCIAL

## 2018-10-16 DIAGNOSIS — M62.08 DIASTASIS RECTI: Primary | ICD-10-CM

## 2018-10-16 NOTE — PROGRESS NOTES
Kieran Luna is a 41year old female who presents today for a follow-up. She continues to wear her abdominal binder for comfort. She complains of mild intermittent  periumbilical discomfort.   The patient also has questions related to hyaluronic acid i

## 2018-12-18 ENCOUNTER — OFFICE VISIT (OUTPATIENT)
Dept: SURGERY | Facility: CLINIC | Age: 38
End: 2018-12-18

## 2018-12-18 DIAGNOSIS — M62.08 DIASTASIS RECTI: Primary | ICD-10-CM

## 2018-12-18 NOTE — PROCEDURES
Revision of abdominal scar. Informed consent was obtained from the patient. The risks, benefits, and alternatives were reviewed with the patient. She expressed understanding wished to proceed.   Dogears at the lateral aspect of the abdominal scar were ma

## 2019-02-15 ENCOUNTER — HOSPITAL ENCOUNTER (OUTPATIENT)
Dept: CT IMAGING | Facility: HOSPITAL | Age: 39
Discharge: HOME | End: 2019-02-15
Attending: INTERNAL MEDICINE
Payer: COMMERCIAL

## 2019-02-15 DIAGNOSIS — R51.9 HEAD ACHE: ICD-10-CM

## 2019-02-15 PROCEDURE — 70450 CT HEAD/BRAIN W/O DYE: CPT | Performed by: INTERNAL MEDICINE

## 2019-03-05 ENCOUNTER — OFFICE VISIT (OUTPATIENT)
Dept: NEUROLOGY | Facility: CLINIC | Age: 39
End: 2019-03-05
Payer: COMMERCIAL

## 2019-03-05 VITALS
RESPIRATION RATE: 16 BRPM | HEIGHT: 63 IN | BODY MASS INDEX: 21.62 KG/M2 | DIASTOLIC BLOOD PRESSURE: 70 MMHG | SYSTOLIC BLOOD PRESSURE: 116 MMHG | WEIGHT: 122 LBS | HEART RATE: 74 BPM

## 2019-03-05 DIAGNOSIS — M54.2 CERVICALGIA: Primary | ICD-10-CM

## 2019-03-05 DIAGNOSIS — R20.2 PARESTHESIA: ICD-10-CM

## 2019-03-05 DIAGNOSIS — R51.9 OCCIPITAL HEADACHE: ICD-10-CM

## 2019-03-05 PROCEDURE — 99244 OFF/OP CNSLTJ NEW/EST MOD 40: CPT | Performed by: OTHER

## 2019-03-05 RX ORDER — METHYLPREDNISOLONE 4 MG/1
TABLET ORAL
Qty: 1 PACKAGE | Refills: 2 | Status: SHIPPED | OUTPATIENT
Start: 2019-03-05 | End: 2019-05-01

## 2019-03-05 RX ORDER — GABAPENTIN 100 MG/1
CAPSULE ORAL
COMMUNITY
End: 2019-05-01

## 2019-03-05 RX ORDER — NAPROXEN SODIUM 550 MG/1
550 TABLET ORAL 2 TIMES DAILY WITH MEALS
Qty: 60 TABLET | Refills: 0 | Status: SHIPPED | OUTPATIENT
Start: 2019-03-05 | End: 2019-05-01

## 2019-03-05 RX ORDER — ERGOCALCIFEROL (VITAMIN D2) 1250 MCG
CAPSULE ORAL
COMMUNITY
End: 2020-02-18

## 2019-03-05 RX ORDER — LEVOTHYROXINE SODIUM 0.03 MG/1
TABLET ORAL
COMMUNITY
Start: 2018-12-22 | End: 2019-12-20 | Stop reason: ALTCHOICE

## 2019-03-05 RX ORDER — METHOCARBAMOL 500 MG/1
500 TABLET, FILM COATED ORAL 2 TIMES DAILY PRN
Qty: 60 TABLET | Refills: 2 | Status: SHIPPED | OUTPATIENT
Start: 2019-03-05 | End: 2019-05-01

## 2019-03-05 NOTE — PROGRESS NOTES
HPI:    Patient ID: Ekta Gomez is a 46year old female. PCP: Dr Elias Trejo      HPI  Antony Black is a 46year old female who presents for evaluation of headache and paresthesia in both hands and feet.  She reports headache started about 1 month and OF A HEMATOMA N/A 3/31/2015    Performed by Amanda Lino MD at [facility] L+D OR   • HERNIA UMBILICAL REPAIR ADULT N/A 2018    Performed by Donnis Ormond, MD at [facility] MAIN OR   • OTHER      LAPROMOTMY AFTER LAST    • PPTL N/A 2016    Per (550 mg total) by mouth 2 (two) times daily with meals.  Disp: 60 tablet Rfl: 0   methylPREDNISolone (MEDROL) 4 MG Oral Tablet Therapy Pack Take it as directed Disp: 1 Package Rfl: 2     Allergies:  Aspirin                 SWELLING, PALPITATIONS   PHYSICAL headache  Paresthesia    Cervico-occipital pain suspecting muscle tension due to cervical spondylosis  Agree with MRI cervical spine to assess for cervical disc disease and to rule out any cervical cord lesions.  She is scheduled for the test tomorrow  Star

## 2019-03-06 ENCOUNTER — HOSPITAL ENCOUNTER (OUTPATIENT)
Dept: MRI IMAGING | Facility: HOSPITAL | Age: 39
Discharge: HOME | End: 2019-03-06
Attending: SPECIALIST
Payer: COMMERCIAL

## 2019-03-06 ENCOUNTER — TELEPHONE (OUTPATIENT)
Dept: NEUROLOGY | Facility: CLINIC | Age: 39
End: 2019-03-06

## 2019-03-06 DIAGNOSIS — G95.9 CERVICAL MYELOPATHY (HCC): ICD-10-CM

## 2019-03-06 DIAGNOSIS — R51.9 HA (HEADACHE): ICD-10-CM

## 2019-03-06 PROCEDURE — 70553 MRI BRAIN STEM W/O & W/DYE: CPT | Performed by: SPECIALIST

## 2019-03-06 PROCEDURE — 72141 MRI NECK SPINE W/O DYE: CPT | Performed by: SPECIALIST

## 2019-03-06 PROCEDURE — A9575 INJ GADOTERATE MEGLUMI 0.1ML: HCPCS

## 2019-03-06 NOTE — TELEPHONE ENCOUNTER
Patient called [facility] and would like to know Dr. Jarocho Mckeon thoughts on having MRI without contrast. After doing research, patient would like to avoid contrast if possible. Routed to provider.

## 2019-05-01 ENCOUNTER — OFFICE VISIT (OUTPATIENT)
Dept: RHEUMATOLOGY | Facility: CLINIC | Age: 39
End: 2019-05-01
Payer: COMMERCIAL

## 2019-05-01 VITALS
TEMPERATURE: 98 F | BODY MASS INDEX: 22 KG/M2 | WEIGHT: 124.81 LBS | DIASTOLIC BLOOD PRESSURE: 68 MMHG | SYSTOLIC BLOOD PRESSURE: 112 MMHG | RESPIRATION RATE: 16 BRPM | HEART RATE: 68 BPM

## 2019-05-01 DIAGNOSIS — G89.29 CHRONIC MIDLINE LOW BACK PAIN WITHOUT SCIATICA: Primary | ICD-10-CM

## 2019-05-01 DIAGNOSIS — M25.50 POLYARTHRALGIA: ICD-10-CM

## 2019-05-01 DIAGNOSIS — M53.3 CHRONIC SI JOINT PAIN: ICD-10-CM

## 2019-05-01 DIAGNOSIS — M54.50 CHRONIC MIDLINE LOW BACK PAIN WITHOUT SCIATICA: Primary | ICD-10-CM

## 2019-05-01 DIAGNOSIS — G89.29 CHRONIC SI JOINT PAIN: ICD-10-CM

## 2019-05-01 PROCEDURE — 99244 OFF/OP CNSLTJ NEW/EST MOD 40: CPT | Performed by: INTERNAL MEDICINE

## 2019-05-01 NOTE — PROGRESS NOTES
?  RHEUMATOLOGY NEW PATIENT   Date of visit: 5/1/2019  ? Patient presents with:  Joint Pain: NP ref by PCP for joint pain. Pt states 'has had multiple areas of joint pain, numbness since December 2018. Has severe back pain.  No family history of autoimmu sciatica  -     XR LUMBAR SPINE (MIN 4 VIEWS) (CPT=72110); Future  -     C-REACTIVE PROTEIN  -     SED RATE, ANTOINETTEREN (AUTOMATED)  -     MONOCLONAL PROTEIN STUDY; Future  -     IMMUNOGLOBULIN A/G/M, QUANT;  Future    Chronic SI joint pain  -     XR SACROI As well as low back stiffness. States this lasts for about 30-60 minutes. Denies obvious swelling of any of the joints. Has been suffering from increased hair loss, thought to be worsened by thyroid disease as well as hormonal changes.    Admits to easy of cervix 09/08/2014    Negative HPV Negative   • Hypotension 2015    Pt reports low BP & dizziness w/ current pregnancy   • Unspecified hypothyroidism      Past Surgical History:  Past Surgical History:   Procedure Laterality Date   • ABDOMINOPLASTY Bilat Encounter  Naproxen Sodium 550 MG Oral Tab        methylPREDNISolone (MEDROL) 4 MG Ora*  methocarbamol 500 MG Oral Tab          gabapentin 100 MG Oral Cap             ?   ?  Allergies:    Aspirin                 SWELLING, PALPITATIONS  Naproxen present. No tracheal deviation present. Cardiovascular: Normal rate, regular rhythm, normal heart sounds and intact distal pulses. No murmur heard. Pulmonary/Chest: Effort normal and breath sounds normal. No respiratory distress. She has no wheezes. stenosis, or foraminal narrowing. C5-C6:  No significant disc/facet abnormality, spinal stenosis, or foraminal narrowing. C6-C7:  There is a small central disc protrusion of approximately 2-3 mm. No significant stenosis noted.   C7-T1:  No significant di ALKPHO 54 08/25/2018    AST 13 (L) 08/25/2018    ALT 15 08/25/2018    BILT 0.2 08/25/2018    TP 6.5 08/25/2018    ALB 3.0 (L) 08/25/2018    GLOBULT 3.2 08/09/2018    GLOBULIN 3.5 08/25/2018    ALBGLOBRAT 1.4 08/09/2018     08/27/2018    K 4.1 08/27/2

## 2019-05-02 ENCOUNTER — TELEPHONE (OUTPATIENT)
Dept: RHEUMATOLOGY | Facility: CLINIC | Age: 39
End: 2019-05-02

## 2019-05-02 NOTE — TELEPHONE ENCOUNTER
Please let pt know that I reviewed labs from her PCP, will recheck inflammatory markers as well as a few other labs to evaluate her pain. She should get xrays done as well. I will call her with results and next recommendations based off results.    Yahaira Wynne

## 2019-08-28 ENCOUNTER — TELEPHONE (OUTPATIENT)
Dept: RHEUMATOLOGY | Facility: CLINIC | Age: 39
End: 2019-08-28

## 2019-08-28 DIAGNOSIS — G89.29 CHRONIC MIDLINE LOW BACK PAIN WITHOUT SCIATICA: ICD-10-CM

## 2019-08-28 DIAGNOSIS — M54.50 CHRONIC MIDLINE LOW BACK PAIN WITHOUT SCIATICA: ICD-10-CM

## 2019-08-28 DIAGNOSIS — M25.50 POLYARTHRALGIA: Primary | ICD-10-CM

## 2019-08-28 DIAGNOSIS — M53.3 CHRONIC SI JOINT PAIN: ICD-10-CM

## 2019-08-28 DIAGNOSIS — E03.9 HYPOTHYROIDISM, UNSPECIFIED TYPE: ICD-10-CM

## 2019-08-28 DIAGNOSIS — G89.29 CHRONIC SI JOINT PAIN: ICD-10-CM

## 2019-08-28 NOTE — TELEPHONE ENCOUNTER
Pt called. Pt states 'is going to have blood work done in the next few days. Would like to know if Dr. Caren Mcgraw can order thyroid testing?  Did call PCP and was told to have Dr. Caren Mcgraw ordered blood work.' Pt was reminded does not have a follow up appt and sh

## 2019-09-10 ENCOUNTER — HOSPITAL ENCOUNTER (OUTPATIENT)
Dept: GENERAL RADIOLOGY | Age: 39
Discharge: HOME | End: 2019-09-10
Attending: INTERNAL MEDICINE
Payer: COMMERCIAL

## 2019-09-10 ENCOUNTER — APPOINTMENT (OUTPATIENT)
Dept: LAB | Age: 39
End: 2019-09-10
Attending: INTERNAL MEDICINE
Payer: COMMERCIAL

## 2019-09-10 DIAGNOSIS — M53.3 CHRONIC SI JOINT PAIN: ICD-10-CM

## 2019-09-10 DIAGNOSIS — G89.29 CHRONIC SI JOINT PAIN: ICD-10-CM

## 2019-09-10 DIAGNOSIS — E03.9 HYPOTHYROIDISM, UNSPECIFIED TYPE: ICD-10-CM

## 2019-09-10 DIAGNOSIS — M54.50 CHRONIC MIDLINE LOW BACK PAIN WITHOUT SCIATICA: ICD-10-CM

## 2019-09-10 DIAGNOSIS — G89.29 CHRONIC MIDLINE LOW BACK PAIN WITHOUT SCIATICA: ICD-10-CM

## 2019-09-10 DIAGNOSIS — M25.50 POLYARTHRALGIA: ICD-10-CM

## 2019-09-10 LAB
CRP SERPL-MCNC: <0.29 MG/DL (ref ?–0.3)
IGA SERPL-MCNC: 258 MG/DL (ref 70–312)
IGM SERPL-MCNC: 84.4 MG/DL (ref 43–279)
IMMUNOGLOBULIN PNL SER-MCNC: 1600 MG/DL (ref 791–1643)
SED RATE-ML: 13 MM/HR (ref 0–25)
T4 FREE SERPL-MCNC: 1 NG/DL (ref 0.8–1.7)
TSI SER-ACNC: 1.76 MIU/ML (ref 0.36–3.74)

## 2019-09-10 PROCEDURE — 72202 X-RAY EXAM SI JOINTS 3/> VWS: CPT | Performed by: INTERNAL MEDICINE

## 2019-09-10 PROCEDURE — 72110 X-RAY EXAM L-2 SPINE 4/>VWS: CPT | Performed by: INTERNAL MEDICINE

## 2019-09-10 PROCEDURE — 86140 C-REACTIVE PROTEIN: CPT | Performed by: INTERNAL MEDICINE

## 2019-09-10 PROCEDURE — 84165 PROTEIN E-PHORESIS SERUM: CPT

## 2019-09-10 PROCEDURE — 85652 RBC SED RATE AUTOMATED: CPT | Performed by: INTERNAL MEDICINE

## 2019-09-10 PROCEDURE — 84439 ASSAY OF FREE THYROXINE: CPT

## 2019-09-10 PROCEDURE — 86334 IMMUNOFIX E-PHORESIS SERUM: CPT

## 2019-09-10 PROCEDURE — 84443 ASSAY THYROID STIM HORMONE: CPT

## 2019-09-10 PROCEDURE — 83883 ASSAY NEPHELOMETRY NOT SPEC: CPT

## 2019-09-10 PROCEDURE — 36415 COLL VENOUS BLD VENIPUNCTURE: CPT | Performed by: INTERNAL MEDICINE

## 2019-09-10 PROCEDURE — 82784 ASSAY IGA/IGD/IGG/IGM EACH: CPT

## 2019-09-17 LAB
ALBUMIN SERPL-MCNC: 4.52 G/DL (ref 3.1–4.5)
ALBUMIN/GLOB SERPL: 1.38 {RATIO}
ALPHA1 GLOB SERPL ELPH-MCNC: 0.2 G/DL (ref 0.1–0.3)
ALPHA2 GLOB SERPL ELPH-MCNC: 0.76 G/DL (ref 0.6–1)
B-GLOBULIN SERPL ELPH-MCNC: 0.86 G/DL (ref 0.7–1.2)
GAMMA GLOB SERPL ELPH-MCNC: 1.46 G/DL (ref 0.6–1.6)
KAPPA FREE LIGHT CHAIN: 1.56 MG/DL (ref 0.33–1.94)
KAPPA/LAMBDA FLC RATIO: 1.24 (ref 0.26–1.65)
LAMBDA FREE LIGHT CHAIN: 1.26 MG/DL (ref 0.57–2.63)
M PROTEIN MFR SERPL ELPH: 7.8 G/DL (ref 6.4–8.2)

## 2019-12-20 ENCOUNTER — APPOINTMENT (OUTPATIENT)
Dept: GENERAL RADIOLOGY | Age: 39
End: 2019-12-20
Attending: PHYSICIAN ASSISTANT
Payer: COMMERCIAL

## 2019-12-20 ENCOUNTER — HOSPITAL ENCOUNTER (OUTPATIENT)
Age: 39
Discharge: HOME | End: 2019-12-20
Payer: COMMERCIAL

## 2019-12-20 VITALS
OXYGEN SATURATION: 100 % | SYSTOLIC BLOOD PRESSURE: 121 MMHG | DIASTOLIC BLOOD PRESSURE: 69 MMHG | BODY MASS INDEX: 20.63 KG/M2 | HEART RATE: 89 BPM | HEIGHT: 62.5 IN | WEIGHT: 115 LBS | RESPIRATION RATE: 18 BRPM | TEMPERATURE: 98 F

## 2019-12-20 DIAGNOSIS — J01.00 ACUTE NON-RECURRENT MAXILLARY SINUSITIS: Primary | ICD-10-CM

## 2019-12-20 PROCEDURE — 99214 OFFICE O/P EST MOD 30 MIN: CPT

## 2019-12-20 PROCEDURE — 71046 X-RAY EXAM CHEST 2 VIEWS: CPT | Performed by: PHYSICIAN ASSISTANT

## 2019-12-20 PROCEDURE — 94640 AIRWAY INHALATION TREATMENT: CPT

## 2019-12-20 RX ORDER — DEXAMETHASONE SODIUM PHOSPHATE 4 MG/ML
16 VIAL (ML) INJECTION ONCE
Status: COMPLETED | OUTPATIENT
Start: 2019-12-20 | End: 2019-12-20

## 2019-12-20 RX ORDER — AMOXICILLIN AND CLAVULANATE POTASSIUM 875; 125 MG/1; MG/1
1 TABLET, FILM COATED ORAL 2 TIMES DAILY
Qty: 20 TABLET | Refills: 0 | Status: SHIPPED | OUTPATIENT
Start: 2019-12-20 | End: 2019-12-30

## 2019-12-20 RX ORDER — ALBUTEROL SULFATE 90 UG/1
2 AEROSOL, METERED RESPIRATORY (INHALATION) EVERY 4 HOURS PRN
Qty: 1 INHALER | Refills: 0 | Status: SHIPPED | OUTPATIENT
Start: 2019-12-20 | End: 2020-01-19

## 2019-12-20 RX ORDER — ALBUTEROL SULFATE 2.5 MG/3ML
2.5 SOLUTION RESPIRATORY (INHALATION) ONCE
Status: COMPLETED | OUTPATIENT
Start: 2019-12-20 | End: 2019-12-20

## 2019-12-20 NOTE — ED INITIAL ASSESSMENT (HPI)
Head and nose congestion, cough which has progressively gotten worse. Coughing up yellow green phlegm. No fever.

## 2019-12-20 NOTE — ED PROVIDER NOTES
Patient Seen in: [facility]      History   Patient presents with:  Cough    Stated Complaint: sore throat, [id number]    HPI    Josselin Coppola is a 46-year-old female who presents today for evaluation of cough, congestion and sore throat.   H L+D OR   • HERNIA UMBILICAL REPAIR ADULT N/A 2018    Performed by Nikita Keating MD at [facility]    • PPTL N/A 2016    Performed by Ayad Whitley DO at [facility] L+D OR                Family histo normal.   Musculoskeletal: Normal range of motion. No edema or tenderness. Neurological: CN III - XII grossly intact, normal strength and sensation. Skin: Skin is warm and dry. No rash noted. No erythema.        ED Course   Labs Reviewed - No data to [facility] Prescribed:  Current Discharge Medication List    START taking these medications    Amoxicillin-Pot Clavulanate 875-125 MG Oral Tab  Take 1 tablet by mouth 2 (two) times daily for 10 days.   Qty: 20 tablet Refills: 0    Albuterol Sulfate  (90 Base) M

## 2020-02-18 ENCOUNTER — OFFICE VISIT (OUTPATIENT)
Dept: OBGYN CLINIC | Facility: CLINIC | Age: 40
End: 2020-02-18
Payer: COMMERCIAL

## 2020-02-18 ENCOUNTER — APPOINTMENT (OUTPATIENT)
Dept: LAB | Age: 40
End: 2020-02-18
Attending: OBSTETRICS & GYNECOLOGY
Payer: COMMERCIAL

## 2020-02-18 VITALS
WEIGHT: 120 LBS | BODY MASS INDEX: 21.26 KG/M2 | DIASTOLIC BLOOD PRESSURE: 60 MMHG | HEART RATE: 62 BPM | SYSTOLIC BLOOD PRESSURE: 90 MMHG | HEIGHT: 63 IN | RESPIRATION RATE: 16 BRPM

## 2020-02-18 DIAGNOSIS — Z01.419 ENCOUNTER FOR WELL WOMAN EXAM WITH ROUTINE GYNECOLOGICAL EXAM: Primary | ICD-10-CM

## 2020-02-18 DIAGNOSIS — N64.4 BREAST PAIN: ICD-10-CM

## 2020-02-18 DIAGNOSIS — Z12.4 CERVICAL CANCER SCREENING: ICD-10-CM

## 2020-02-18 LAB
ALBUMIN SERPL-MCNC: 4.2 G/DL (ref 3.4–5)
ALBUMIN/GLOB SERPL: 1 {RATIO} (ref 1–2)
ALP LIVER SERPL-CCNC: 62 U/L (ref 37–98)
ALT SERPL-CCNC: 23 U/L (ref 13–56)
ANION GAP SERPL CALC-SCNC: 6 MMOL/L (ref 0–18)
AST SERPL-CCNC: 17 U/L (ref 15–37)
BASOPHILS # BLD AUTO: 0.12 X10(3) UL (ref 0–0.2)
BASOPHILS NFR BLD AUTO: 1.5 %
BILIRUB SERPL-MCNC: 0.5 MG/DL (ref 0.1–2)
BUN BLD-MCNC: 12 MG/DL (ref 7–18)
BUN/CREAT SERPL: 16.2 (ref 10–20)
CALCIUM BLD-MCNC: 9 MG/DL (ref 8.5–10.1)
CHLORIDE SERPL-SCNC: 107 MMOL/L (ref 98–112)
CO2 SERPL-SCNC: 25 MMOL/L (ref 21–32)
CREAT BLD-MCNC: 0.74 MG/DL (ref 0.55–1.02)
DEPRECATED RDW RBC AUTO: 44.4 FL (ref 35.1–46.3)
EOSINOPHIL # BLD AUTO: 0.17 X10(3) UL (ref 0–0.7)
EOSINOPHIL NFR BLD AUTO: 2.1 %
ERYTHROCYTE [DISTWIDTH] IN BLOOD BY AUTOMATED COUNT: 13.9 % (ref 11–15)
GLOBULIN PLAS-MCNC: 4.4 G/DL (ref 2.8–4.4)
GLUCOSE BLD-MCNC: 60 MG/DL (ref 70–99)
HCT VFR BLD AUTO: 43.2 % (ref 35–48)
HGB BLD-MCNC: 13.6 G/DL (ref 12–16)
IMM GRANULOCYTES # BLD AUTO: 0.02 X10(3) UL (ref 0–1)
IMM GRANULOCYTES NFR BLD: 0.3 %
LYMPHOCYTES # BLD AUTO: 2.4 X10(3) UL (ref 1–4)
LYMPHOCYTES NFR BLD AUTO: 30.1 %
M PROTEIN MFR SERPL ELPH: 8.6 G/DL (ref 6.4–8.2)
MCH RBC QN AUTO: 27.6 PG (ref 26–34)
MCHC RBC AUTO-ENTMCNC: 31.5 G/DL (ref 31–37)
MCV RBC AUTO: 87.8 FL (ref 80–100)
MONOCYTES # BLD AUTO: 0.6 X10(3) UL (ref 0.1–1)
MONOCYTES NFR BLD AUTO: 7.5 %
NEUTROPHILS # BLD AUTO: 4.67 X10 (3) UL (ref 1.5–7.7)
NEUTROPHILS # BLD AUTO: 4.67 X10(3) UL (ref 1.5–7.7)
NEUTROPHILS NFR BLD AUTO: 58.5 %
OSMOLALITY SERPL CALC.SUM OF ELEC: 284 MOSM/KG (ref 275–295)
PATIENT FASTING Y/N/NP: NO
PLATELET # BLD AUTO: 326 10(3)UL (ref 150–450)
POTASSIUM SERPL-SCNC: 3.7 MMOL/L (ref 3.5–5.1)
RBC # BLD AUTO: 4.92 X10(6)UL (ref 3.8–5.3)
SODIUM SERPL-SCNC: 138 MMOL/L (ref 136–145)
TSI SER-ACNC: 2 MIU/ML (ref 0.36–3.74)
VIT D+METAB SERPL-MCNC: 39.6 NG/ML (ref 30–100)
WBC # BLD AUTO: 8 X10(3) UL (ref 4–11)

## 2020-02-18 PROCEDURE — 88175 CYTOPATH C/V AUTO FLUID REDO: CPT | Performed by: OBSTETRICS & GYNECOLOGY

## 2020-02-18 PROCEDURE — 87624 HPV HI-RISK TYP POOLED RSLT: CPT | Performed by: OBSTETRICS & GYNECOLOGY

## 2020-02-18 PROCEDURE — 80053 COMPREHEN METABOLIC PANEL: CPT | Performed by: OBSTETRICS & GYNECOLOGY

## 2020-02-18 PROCEDURE — 82306 VITAMIN D 25 HYDROXY: CPT | Performed by: OBSTETRICS & GYNECOLOGY

## 2020-02-18 PROCEDURE — 85025 COMPLETE CBC W/AUTO DIFF WBC: CPT | Performed by: OBSTETRICS & GYNECOLOGY

## 2020-02-18 PROCEDURE — 99395 PREV VISIT EST AGE 18-39: CPT | Performed by: OBSTETRICS & GYNECOLOGY

## 2020-02-18 PROCEDURE — 84443 ASSAY THYROID STIM HORMONE: CPT | Performed by: OBSTETRICS & GYNECOLOGY

## 2020-02-18 NOTE — PROGRESS NOTES
Ekta Gomez is a 46year old female [id number] Patient's last menstrual period was 01/24/2020. Patient presents with:  Breast Pain: Left side x2 months  .   Patient c/o left breast localized pooling pain for the past 2 months  OBSTETRICS HISTORY:  OB Hi file      Transportation needs:        Medical: Not on file        Non-medical: Not on file    Tobacco Use      Smoking status: Never Smoker      Smokeless tobacco: Never Used    Substance and Sexual Activity      Alcohol use: No        Alcohol/week: 0.0 s hepatitis C       MEDICATIONS:  No current outpatient medications on file.     ALLERGIES:    Aspirin                 SWELLING, PALPITATIONS  Naproxen                OTHER (SEE COMMENTS)    Comment:GI upset, acidity, unable to eat for 3-4 days      Review of tenderness  Adnexa: normal without masses or tenderness  Perineum: normal  Anus: no hemorroids     Assessment & Plan:  Diagnoses and all orders for this visit:    Encounter for well woman exam with routine gynecological exam  -     CBC WITH DIFFERENTIAL WI

## 2020-02-19 LAB — HPV I/H RISK 1 DNA SPEC QL NAA+PROBE: NEGATIVE

## 2020-02-24 ENCOUNTER — TELEPHONE (OUTPATIENT)
Dept: OBGYN CLINIC | Facility: CLINIC | Age: 40
End: 2020-02-24

## 2020-02-24 ENCOUNTER — HOSPITAL ENCOUNTER (OUTPATIENT)
Dept: MAMMOGRAPHY | Facility: HOSPITAL | Age: 40
Discharge: HOME | End: 2020-02-24
Attending: OBSTETRICS & GYNECOLOGY
Payer: COMMERCIAL

## 2020-02-24 DIAGNOSIS — N64.4 BREAST PAIN: ICD-10-CM

## 2020-02-24 PROCEDURE — 77061 BREAST TOMOSYNTHESIS UNI: CPT | Performed by: OBSTETRICS & GYNECOLOGY

## 2020-02-24 PROCEDURE — 76642 ULTRASOUND BREAST LIMITED: CPT | Performed by: OBSTETRICS & GYNECOLOGY

## 2020-02-24 PROCEDURE — 77065 DX MAMMO INCL CAD UNI: CPT | Performed by: OBSTETRICS & GYNECOLOGY

## 2020-02-24 NOTE — TELEPHONE ENCOUNTER
Patient aware of normal results of mammogram. Verbalized understanding and states that doctor said if all tests come back negative then she may prescribe something for the breast pain. Will call back to make appointment if needed.

## 2020-04-27 ENCOUNTER — TELEMEDICINE (OUTPATIENT)
Dept: OBGYN CLINIC | Facility: CLINIC | Age: 40
End: 2020-04-27

## 2020-04-27 DIAGNOSIS — N64.4 BREAST PAIN, LEFT: Primary | ICD-10-CM

## 2020-04-27 PROCEDURE — 99213 OFFICE O/P EST LOW 20 MIN: CPT | Performed by: OBSTETRICS & GYNECOLOGY

## 2020-04-27 NOTE — PROGRESS NOTES
Radha Doherty is a 45year old female  No LMP recorded. No chief complaint on file. .  Video visit initiated by patient from her home    patient c/o left breast pain for about 6 months, no lump, no nipple discharge, no skin changes.  Patient ha strain: Not on file      Food insecurity:        Worry: Not on file        Inability: Not on file      Transportation needs:        Medical: Not on file        Non-medical: Not on file    Tobacco Use      Smoking status: Never Smoker      Smokeless tobacco Grandmother    • Hypertension Paternal Grandfather    • Other (Other[other]) Sister         hepatitis C       MEDICATIONS:  No current outpatient medications on file.     ALLERGIES:    Aspirin                 SWELLING, PALPITATIONS  Naproxen

## 2020-05-05 ENCOUNTER — TELEPHONE (OUTPATIENT)
Dept: OBGYN CLINIC | Facility: CLINIC | Age: 40
End: 2020-05-05

## 2020-05-05 NOTE — TELEPHONE ENCOUNTER
Pt received  a script for violet iodine supplement. pt does have a history with her thyroid. should she be taking this med?

## 2020-05-05 NOTE — TELEPHONE ENCOUNTER
Patient states it was recommended for her  to take Liz  supplement  for her breast discomfort and wants to know if it is OK to take as patient says it may  cause thyroid problems? Please advise.

## 2020-05-05 NOTE — TELEPHONE ENCOUNTER
Patient informed that doctor was consulted and it is highly unlikely to cause a thyroid problem.  Verbalized understanding

## 2020-05-06 ENCOUNTER — TELEPHONE (OUTPATIENT)
Dept: OBGYN CLINIC | Facility: CLINIC | Age: 40
End: 2020-05-06

## 2020-05-06 NOTE — TELEPHONE ENCOUNTER
Spoke with patient regarding her left breast which we have been following. Patient states she has been having pain for 6-7 months constantly. She states her left nipple is slightly flatter than the right one.  She states her left arm is more swollen then he

## 2020-05-06 NOTE — TELEPHONE ENCOUNTER
Pt just called to give us more symptoms about her problem with her left breast. pls call pt and advise what to do.  Thanks

## 2020-05-11 ENCOUNTER — OFFICE VISIT (OUTPATIENT)
Dept: SURGERY | Facility: CLINIC | Age: 40
End: 2020-05-11
Payer: COMMERCIAL

## 2020-05-11 VITALS — TEMPERATURE: 99 F

## 2020-05-11 DIAGNOSIS — N64.4 MASTODYNIA OF LEFT BREAST: Primary | ICD-10-CM

## 2020-05-11 PROCEDURE — 99213 OFFICE O/P EST LOW 20 MIN: CPT | Performed by: SURGERY

## 2020-05-11 RX ORDER — FLUTICASONE PROPIONATE AND SALMETEROL 50; 250 UG/1; UG/1
1 POWDER RESPIRATORY (INHALATION) 2 TIMES DAILY
COMMUNITY
Start: 2020-03-31

## 2020-05-11 NOTE — H&P
New Patient Visit Note       Active Problems      1. Mastodynia of left breast        Chief Complaint   Patient presents with:  Breast Pain: Est pt, new problem, left breast pain and swelling that started 7 months ago.  Johanny and US done in January after seei HPV Negative   • Hypotension 2015    Pt reports low BP & dizziness w/ current pregnancy   • Unspecified hypothyroidism      Past Surgical History:   Procedure Laterality Date   • ABDOMINOPLASTY Bilateral 08/23/2018    Hernia Repair & Rectus Diastasis   • A Seat Belt: Yes        Sleep Concern: Yes          kids get up at night        Self-Exams: No       Current Outpatient Medications:   •  ADVAIR DISKUS 250-50 MCG/DOSE Inhalation Aerosol Powder, Breath Activated, Inhale 1 puff into the lungs 2 (two) times da no rales. She exhibits no mass. Right breast exhibits no inverted nipple, no mass, no nipple discharge, no skin change and no tenderness. Left breast exhibits tenderness.  Left breast exhibits no inverted nipple, no mass, no nipple discharge and no skin minna RECOMMENDATIONS:    CLINICAL EVALUATION. A letter explaining the results in lay terms has been sent to the patient. This exam was evaluated with a computer-aided device.   This patient's information has been entered into a reminder system wit

## 2020-05-15 ENCOUNTER — HOSPITAL ENCOUNTER (OUTPATIENT)
Dept: MRI IMAGING | Facility: HOSPITAL | Age: 40
Discharge: HOME | End: 2020-05-15
Attending: SURGERY
Payer: COMMERCIAL

## 2020-05-15 DIAGNOSIS — N64.4 MASTODYNIA OF LEFT BREAST: ICD-10-CM

## 2020-05-15 PROCEDURE — A9575 INJ GADOTERATE MEGLUMI 0.1ML: HCPCS

## 2020-05-15 PROCEDURE — 77049 MRI BREAST C-+ W/CAD BI: CPT | Performed by: SURGERY

## 2020-05-19 ENCOUNTER — TELEPHONE (OUTPATIENT)
Dept: SURGERY | Facility: CLINIC | Age: 40
End: 2020-05-19

## 2020-05-19 DIAGNOSIS — N63.10 BREAST MASS, RIGHT: ICD-10-CM

## 2020-05-19 DIAGNOSIS — N64.4 MASTODYNIA OF LEFT BREAST: Primary | ICD-10-CM

## 2020-05-19 NOTE — TELEPHONE ENCOUNTER
I called the patient to discuss her MRI results. I reviewed the images with Dr. Vangie Leblanc from radiology. The left breast demonstrates dense breast tissue, which is most likely the source of her pain. The right MRI does show on mass that appears benign.   Sarah Plaza

## 2020-05-21 ENCOUNTER — PATIENT OUTREACH (OUTPATIENT)
Dept: SURGERY | Facility: CLINIC | Age: 40
End: 2020-05-21

## 2020-05-21 ENCOUNTER — HOSPITAL ENCOUNTER (OUTPATIENT)
Dept: MAMMOGRAPHY | Facility: HOSPITAL | Age: 40
Discharge: HOME | End: 2020-05-21
Attending: SURGERY
Payer: COMMERCIAL

## 2020-05-21 DIAGNOSIS — R93.89 ABNORMAL MRI: ICD-10-CM

## 2020-05-21 PROCEDURE — 76642 ULTRASOUND BREAST LIMITED: CPT | Performed by: SURGERY

## 2021-05-06 ENCOUNTER — HOSPITAL ENCOUNTER (OUTPATIENT)
Dept: ULTRASOUND IMAGING | Age: 41
Discharge: HOME | End: 2021-05-06
Attending: SPECIALIST
Payer: COMMERCIAL

## 2021-05-06 DIAGNOSIS — E03.9 HYPOTHYROIDISM, UNSPECIFIED TYPE: ICD-10-CM

## 2021-05-06 PROCEDURE — 76536 US EXAM OF HEAD AND NECK: CPT | Performed by: SPECIALIST

## 2021-05-08 ENCOUNTER — HOSPITAL ENCOUNTER (OUTPATIENT)
Dept: GENERAL RADIOLOGY | Age: 41
Discharge: HOME | End: 2021-05-08
Attending: SPECIALIST
Payer: COMMERCIAL

## 2021-05-08 DIAGNOSIS — M54.15 RADICULOPATHY, THORACOLUMBAR REGION: ICD-10-CM

## 2021-05-08 PROCEDURE — 72072 X-RAY EXAM THORAC SPINE 3VWS: CPT | Performed by: SPECIALIST

## 2022-09-01 PROCEDURE — 93005 ELECTROCARDIOGRAM TRACING: CPT

## 2022-09-01 PROCEDURE — 99284 EMERGENCY DEPT VISIT MOD MDM: CPT

## 2022-09-01 PROCEDURE — 93010 ELECTROCARDIOGRAM REPORT: CPT

## 2022-09-01 PROCEDURE — 36415 COLL VENOUS BLD VENIPUNCTURE: CPT

## 2022-09-01 RX ORDER — LEVOTHYROXINE SODIUM 88 UG/1
88 TABLET ORAL
COMMUNITY

## 2022-09-02 ENCOUNTER — HOSPITAL ENCOUNTER (EMERGENCY)
Facility: HOSPITAL | Age: 42
Discharge: HOME | End: 2022-09-02
Attending: EMERGENCY MEDICINE
Payer: COMMERCIAL

## 2022-09-02 ENCOUNTER — APPOINTMENT (OUTPATIENT)
Dept: GENERAL RADIOLOGY | Facility: HOSPITAL | Age: 42
End: 2022-09-02
Attending: EMERGENCY MEDICINE
Payer: COMMERCIAL

## 2022-09-02 VITALS
BODY MASS INDEX: 23.19 KG/M2 | SYSTOLIC BLOOD PRESSURE: 116 MMHG | WEIGHT: 126 LBS | HEIGHT: 62 IN | HEART RATE: 68 BPM | OXYGEN SATURATION: 100 % | DIASTOLIC BLOOD PRESSURE: 86 MMHG | TEMPERATURE: 99 F | RESPIRATION RATE: 20 BRPM

## 2022-09-02 DIAGNOSIS — I49.3 PVC (PREMATURE VENTRICULAR CONTRACTION): Primary | ICD-10-CM

## 2022-09-02 DIAGNOSIS — E87.6 HYPOKALEMIA: ICD-10-CM

## 2022-09-02 LAB
ALBUMIN SERPL-MCNC: 4.2 G/DL (ref 3.4–5)
ALBUMIN/GLOB SERPL: 0.9 {RATIO} (ref 1–2)
ALP LIVER SERPL-CCNC: 79 U/L
ALT SERPL-CCNC: 24 U/L
ANION GAP SERPL CALC-SCNC: 6 MMOL/L (ref 0–18)
AST SERPL-CCNC: 15 U/L (ref 15–37)
ATRIAL RATE: 78 BPM
BASOPHILS # BLD AUTO: 0.14 X10(3) UL (ref 0–0.2)
BASOPHILS NFR BLD AUTO: 1.1 %
BILIRUB SERPL-MCNC: 0.3 MG/DL (ref 0.1–2)
BUN BLD-MCNC: 10 MG/DL (ref 7–18)
CALCIUM BLD-MCNC: 9.3 MG/DL (ref 8.5–10.1)
CHLORIDE SERPL-SCNC: 106 MMOL/L (ref 98–112)
CO2 SERPL-SCNC: 27 MMOL/L (ref 21–32)
CREAT BLD-MCNC: 0.86 MG/DL
EOSINOPHIL # BLD AUTO: 0.19 X10(3) UL (ref 0–0.7)
EOSINOPHIL NFR BLD AUTO: 1.5 %
ERYTHROCYTE [DISTWIDTH] IN BLOOD BY AUTOMATED COUNT: 13.2 %
GFR SERPLBLD BASED ON 1.73 SQ M-ARVRAT: 87 ML/MIN/1.73M2 (ref 60–?)
GLOBULIN PLAS-MCNC: 4.7 G/DL (ref 2.8–4.4)
GLUCOSE BLD-MCNC: 98 MG/DL (ref 70–99)
HCT VFR BLD AUTO: 41 %
HGB BLD-MCNC: 13.1 G/DL
IMM GRANULOCYTES # BLD AUTO: 0.05 X10(3) UL (ref 0–1)
IMM GRANULOCYTES NFR BLD: 0.4 %
LYMPHOCYTES # BLD AUTO: 4.8 X10(3) UL (ref 1–4)
LYMPHOCYTES NFR BLD AUTO: 38.6 %
MCH RBC QN AUTO: 27.2 PG (ref 26–34)
MCHC RBC AUTO-ENTMCNC: 32 G/DL (ref 31–37)
MCV RBC AUTO: 85.1 FL
MONOCYTES # BLD AUTO: 0.9 X10(3) UL (ref 0.1–1)
MONOCYTES NFR BLD AUTO: 7.2 %
NEUTROPHILS # BLD AUTO: 6.34 X10 (3) UL (ref 1.5–7.7)
NEUTROPHILS # BLD AUTO: 6.34 X10(3) UL (ref 1.5–7.7)
NEUTROPHILS NFR BLD AUTO: 51.2 %
OSMOLALITY SERPL CALC.SUM OF ELEC: 287 MOSM/KG (ref 275–295)
P AXIS: 68 DEGREES
P-R INTERVAL: 130 MS
PLATELET # BLD AUTO: 382 10(3)UL (ref 150–450)
POTASSIUM SERPL-SCNC: 3.4 MMOL/L (ref 3.5–5.1)
PROT SERPL-MCNC: 8.9 G/DL (ref 6.4–8.2)
Q-T INTERVAL: 376 MS
QRS DURATION: 72 MS
QTC CALCULATION (BEZET): 428 MS
R AXIS: 62 DEGREES
RBC # BLD AUTO: 4.82 X10(6)UL
SODIUM SERPL-SCNC: 139 MMOL/L (ref 136–145)
T AXIS: 42 DEGREES
TROPONIN I HIGH SENSITIVITY: 3 NG/L
TSI SER-ACNC: 0.9 MIU/ML (ref 0.36–3.74)
VENTRICULAR RATE: 78 BPM
WBC # BLD AUTO: 12.4 X10(3) UL (ref 4–11)

## 2022-09-02 PROCEDURE — 84443 ASSAY THYROID STIM HORMONE: CPT | Performed by: EMERGENCY MEDICINE

## 2022-09-02 PROCEDURE — 84484 ASSAY OF TROPONIN QUANT: CPT | Performed by: EMERGENCY MEDICINE

## 2022-09-02 PROCEDURE — 71045 X-RAY EXAM CHEST 1 VIEW: CPT | Performed by: EMERGENCY MEDICINE

## 2022-09-02 PROCEDURE — 80053 COMPREHEN METABOLIC PANEL: CPT | Performed by: EMERGENCY MEDICINE

## 2022-09-02 PROCEDURE — 85025 COMPLETE CBC W/AUTO DIFF WBC: CPT | Performed by: EMERGENCY MEDICINE

## 2022-09-02 RX ORDER — POTASSIUM CHLORIDE 20 MEQ/1
40 TABLET, EXTENDED RELEASE ORAL ONCE
Status: COMPLETED | OUTPATIENT
Start: 2022-09-02 | End: 2022-09-02

## 2022-09-02 NOTE — ED INITIAL ASSESSMENT (HPI)
Pt to ED c/o palpitations x 3 hours. Pt reports she had covid at the end of July, and has been having palpitations intermittently since, but never this long. Pt also has a cough that she feels like gets worse with the palpitations. Pt was seen by PCP after having covid and was prescribed amoxicillin for an ear ache and she finished that prescription on Monday.

## 2022-09-02 NOTE — ED QUICK NOTES
Dr Tera Ramírez at bedside talking at length with pt re: results/POC.  Pt denies CP, denies palpitations at this time

## 2022-09-08 ENCOUNTER — HOSPITAL ENCOUNTER (OUTPATIENT)
Dept: MAMMOGRAPHY | Age: 42
Discharge: HOME | End: 2022-09-08
Attending: SPECIALIST
Payer: COMMERCIAL

## 2022-09-08 DIAGNOSIS — Z12.31 VISIT FOR SCREENING MAMMOGRAM: ICD-10-CM

## 2022-09-08 PROCEDURE — 77067 SCR MAMMO BI INCL CAD: CPT | Performed by: SPECIALIST

## 2022-09-08 PROCEDURE — 77063 BREAST TOMOSYNTHESIS BI: CPT | Performed by: SPECIALIST

## 2023-03-17 DIAGNOSIS — R42 VERTIGO: Primary | ICD-10-CM

## 2023-04-18 ENCOUNTER — HOSPITAL ENCOUNTER (OUTPATIENT)
Dept: MRI IMAGING | Age: 43
Discharge: HOME | End: 2023-04-18
Attending: STUDENT IN AN ORGANIZED HEALTH CARE EDUCATION/TRAINING PROGRAM
Payer: COMMERCIAL

## 2023-04-18 DIAGNOSIS — R42 VERTIGO: ICD-10-CM

## 2023-04-18 PROCEDURE — 70549 MR ANGIOGRAPH NECK W/O&W/DYE: CPT | Performed by: STUDENT IN AN ORGANIZED HEALTH CARE EDUCATION/TRAINING PROGRAM

## 2023-04-18 PROCEDURE — A9575 INJ GADOTERATE MEGLUMI 0.1ML: HCPCS | Performed by: STUDENT IN AN ORGANIZED HEALTH CARE EDUCATION/TRAINING PROGRAM

## 2023-04-18 PROCEDURE — 70553 MRI BRAIN STEM W/O & W/DYE: CPT | Performed by: STUDENT IN AN ORGANIZED HEALTH CARE EDUCATION/TRAINING PROGRAM

## 2023-04-18 PROCEDURE — 70546 MR ANGIOGRAPH HEAD W/O&W/DYE: CPT | Performed by: STUDENT IN AN ORGANIZED HEALTH CARE EDUCATION/TRAINING PROGRAM

## 2023-04-18 RX ORDER — GADOTERATE MEGLUMINE 376.9 MG/ML
15 INJECTION INTRAVENOUS
Status: COMPLETED | OUTPATIENT
Start: 2023-04-18 | End: 2023-04-18

## 2023-04-18 RX ADMIN — GADOTERATE MEGLUMINE 15 ML: 376.9 INJECTION INTRAVENOUS at 09:00:00

## 2023-10-11 ENCOUNTER — OFFICE VISIT (OUTPATIENT)
Dept: NEUROLOGY | Facility: CLINIC | Age: 43
End: 2023-10-11
Payer: COMMERCIAL

## 2023-10-11 VITALS
HEIGHT: 62 IN | HEART RATE: 54 BPM | OXYGEN SATURATION: 100 % | BODY MASS INDEX: 23.55 KG/M2 | DIASTOLIC BLOOD PRESSURE: 67 MMHG | SYSTOLIC BLOOD PRESSURE: 116 MMHG | WEIGHT: 128 LBS | RESPIRATION RATE: 18 BRPM

## 2023-10-11 DIAGNOSIS — R20.9 SKIN SENSATION DISTURBANCE: ICD-10-CM

## 2023-10-11 DIAGNOSIS — M79.10 MYALGIA: ICD-10-CM

## 2023-10-11 DIAGNOSIS — M25.50 ARTHRALGIA, UNSPECIFIED JOINT: ICD-10-CM

## 2023-10-11 DIAGNOSIS — R26.89 BALANCE PROBLEM: Primary | ICD-10-CM

## 2023-10-11 PROCEDURE — 3074F SYST BP LT 130 MM HG: CPT | Performed by: OTHER

## 2023-10-11 PROCEDURE — 3078F DIAST BP <80 MM HG: CPT | Performed by: OTHER

## 2023-10-11 PROCEDURE — 3008F BODY MASS INDEX DOCD: CPT | Performed by: OTHER

## 2023-10-11 PROCEDURE — 99204 OFFICE O/P NEW MOD 45 MIN: CPT | Performed by: OTHER

## 2023-11-22 ENCOUNTER — TELEPHONE (OUTPATIENT)
Dept: NEUROLOGY | Facility: CLINIC | Age: 43
End: 2023-11-22

## 2023-11-22 NOTE — TELEPHONE ENCOUNTER
Labs ordered during 10/11/2023 office visit. Sent reminder via [employer] message to complete. Last active on 11/21/2023.

## 2023-11-28 ENCOUNTER — HOSPITAL ENCOUNTER (OUTPATIENT)
Dept: MAMMOGRAPHY | Age: 43
Discharge: HOME | End: 2023-11-28
Attending: SPECIALIST
Payer: COMMERCIAL

## 2023-11-28 DIAGNOSIS — Z12.31 SCREENING MAMMOGRAM FOR BREAST CANCER: ICD-10-CM

## 2023-11-28 DIAGNOSIS — Z12.31 ENCOUNTER FOR SCREENING MAMMOGRAM FOR MALIGNANT NEOPLASM OF BREAST: ICD-10-CM

## 2023-11-28 PROCEDURE — 77063 BREAST TOMOSYNTHESIS BI: CPT | Performed by: SPECIALIST

## 2023-11-28 PROCEDURE — 77067 SCR MAMMO BI INCL CAD: CPT | Performed by: SPECIALIST

## 2023-11-29 LAB
C-REACTIVE PROTEIN: 2 MG/L
CREATINE KINASE, TOTAL: 37 U/L (ref 29–143)
RHEUMATOID FACTOR: <14 IU/ML
SED RATE BY MODIFIED$WESTERGREN: 11 MM/H
THYROGLOBULIN ANTIBODIES: <1 IU/ML

## 2023-12-15 ENCOUNTER — HOSPITAL ENCOUNTER (EMERGENCY)
Facility: HOSPITAL | Age: 43
Discharge: HOME | End: 2023-12-15
Attending: EMERGENCY MEDICINE
Payer: COMMERCIAL

## 2023-12-15 ENCOUNTER — APPOINTMENT (OUTPATIENT)
Dept: ULTRASOUND IMAGING | Facility: HOSPITAL | Age: 43
End: 2023-12-15
Attending: EMERGENCY MEDICINE
Payer: COMMERCIAL

## 2023-12-15 VITALS
OXYGEN SATURATION: 99 % | TEMPERATURE: 99 F | HEART RATE: 72 BPM | DIASTOLIC BLOOD PRESSURE: 64 MMHG | SYSTOLIC BLOOD PRESSURE: 113 MMHG | RESPIRATION RATE: 16 BRPM

## 2023-12-15 DIAGNOSIS — N83.202 CYSTS OF BOTH OVARIES: ICD-10-CM

## 2023-12-15 DIAGNOSIS — N93.8 DUB (DYSFUNCTIONAL UTERINE BLEEDING): Primary | ICD-10-CM

## 2023-12-15 DIAGNOSIS — N83.201 CYSTS OF BOTH OVARIES: ICD-10-CM

## 2023-12-15 LAB
ALBUMIN SERPL-MCNC: 3.8 G/DL (ref 3.4–5)
ALBUMIN/GLOB SERPL: 1 {RATIO} (ref 1–2)
ALP LIVER SERPL-CCNC: 62 U/L
ALT SERPL-CCNC: 20 U/L
ANION GAP SERPL CALC-SCNC: 1 MMOL/L (ref 0–18)
ANTIBODY SCREEN: NEGATIVE
AST SERPL-CCNC: 19 U/L (ref 15–37)
B-HCG UR QL: NEGATIVE
BASOPHILS # BLD AUTO: 0.14 X10(3) UL (ref 0–0.2)
BASOPHILS NFR BLD AUTO: 1.6 %
BILIRUB SERPL-MCNC: 0.2 MG/DL (ref 0.1–2)
BILIRUB UR QL STRIP.AUTO: NEGATIVE
BUN BLD-MCNC: 10 MG/DL (ref 9–23)
CALCIUM BLD-MCNC: 9.2 MG/DL (ref 8.5–10.1)
CHLORIDE SERPL-SCNC: 107 MMOL/L (ref 98–112)
CLARITY UR REFRACT.AUTO: CLEAR
CO2 SERPL-SCNC: 29 MMOL/L (ref 21–32)
COLOR UR AUTO: COLORLESS
CREAT BLD-MCNC: 0.78 MG/DL
EGFRCR SERPLBLD CKD-EPI 2021: 97 ML/MIN/1.73M2 (ref 60–?)
EOSINOPHIL # BLD AUTO: 0.17 X10(3) UL (ref 0–0.7)
EOSINOPHIL NFR BLD AUTO: 1.9 %
ERYTHROCYTE [DISTWIDTH] IN BLOOD BY AUTOMATED COUNT: 13 %
GLOBULIN PLAS-MCNC: 3.8 G/DL (ref 2.8–4.4)
GLUCOSE BLD-MCNC: 111 MG/DL (ref 70–99)
GLUCOSE UR STRIP.AUTO-MCNC: NORMAL MG/DL
HCT VFR BLD AUTO: 38 %
HGB BLD-MCNC: 12.4 G/DL
IMM GRANULOCYTES # BLD AUTO: 0.03 X10(3) UL (ref 0–1)
IMM GRANULOCYTES NFR BLD: 0.3 %
INR BLD: 0.97 (ref 0.8–1.2)
KETONES UR STRIP.AUTO-MCNC: NEGATIVE MG/DL
LEUKOCYTE ESTERASE UR QL STRIP.AUTO: NEGATIVE
LYMPHOCYTES # BLD AUTO: 2.98 X10(3) UL (ref 1–4)
LYMPHOCYTES NFR BLD AUTO: 33.6 %
MCH RBC QN AUTO: 27.2 PG (ref 26–34)
MCHC RBC AUTO-ENTMCNC: 32.6 G/DL (ref 31–37)
MCV RBC AUTO: 83.3 FL
MONOCYTES # BLD AUTO: 0.67 X10(3) UL (ref 0.1–1)
MONOCYTES NFR BLD AUTO: 7.5 %
NEUTROPHILS # BLD AUTO: 4.89 X10 (3) UL (ref 1.5–7.7)
NEUTROPHILS # BLD AUTO: 4.89 X10(3) UL (ref 1.5–7.7)
NEUTROPHILS NFR BLD AUTO: 55.1 %
NITRITE UR QL STRIP.AUTO: NEGATIVE
OSMOLALITY SERPL CALC.SUM OF ELEC: 284 MOSM/KG (ref 275–295)
PH UR STRIP.AUTO: 6.5 [PH] (ref 5–8)
PLATELET # BLD AUTO: 326 10(3)UL (ref 150–450)
POTASSIUM SERPL-SCNC: 4.2 MMOL/L (ref 3.5–5.1)
PROT SERPL-MCNC: 7.6 G/DL (ref 6.4–8.2)
PROT UR STRIP.AUTO-MCNC: 30 MG/DL
PROTHROMBIN TIME: 12.9 SECONDS (ref 11.6–14.8)
RBC # BLD AUTO: 4.56 X10(6)UL
RH BLOOD TYPE: POSITIVE
SODIUM SERPL-SCNC: 137 MMOL/L (ref 136–145)
SP GR UR STRIP.AUTO: <1.005 (ref 1–1.03)
UROBILINOGEN UR STRIP.AUTO-MCNC: NORMAL MG/DL
WBC # BLD AUTO: 8.9 X10(3) UL (ref 4–11)

## 2023-12-15 PROCEDURE — 86850 RBC ANTIBODY SCREEN: CPT | Performed by: EMERGENCY MEDICINE

## 2023-12-15 PROCEDURE — 85025 COMPLETE CBC W/AUTO DIFF WBC: CPT | Performed by: EMERGENCY MEDICINE

## 2023-12-15 PROCEDURE — 93975 VASCULAR STUDY: CPT | Performed by: EMERGENCY MEDICINE

## 2023-12-15 PROCEDURE — 76830 TRANSVAGINAL US NON-OB: CPT | Performed by: EMERGENCY MEDICINE

## 2023-12-15 PROCEDURE — 86901 BLOOD TYPING SEROLOGIC RH(D): CPT | Performed by: EMERGENCY MEDICINE

## 2023-12-15 PROCEDURE — 86900 BLOOD TYPING SEROLOGIC ABO: CPT | Performed by: EMERGENCY MEDICINE

## 2023-12-15 PROCEDURE — 85610 PROTHROMBIN TIME: CPT | Performed by: EMERGENCY MEDICINE

## 2023-12-15 PROCEDURE — 96374 THER/PROPH/DIAG INJ IV PUSH: CPT

## 2023-12-15 PROCEDURE — 99285 EMERGENCY DEPT VISIT HI MDM: CPT

## 2023-12-15 PROCEDURE — 81025 URINE PREGNANCY TEST: CPT

## 2023-12-15 PROCEDURE — 76856 US EXAM PELVIC COMPLETE: CPT | Performed by: EMERGENCY MEDICINE

## 2023-12-15 PROCEDURE — 80053 COMPREHEN METABOLIC PANEL: CPT | Performed by: EMERGENCY MEDICINE

## 2023-12-15 PROCEDURE — 81001 URINALYSIS AUTO W/SCOPE: CPT | Performed by: EMERGENCY MEDICINE

## 2023-12-15 PROCEDURE — 96361 HYDRATE IV INFUSION ADD-ON: CPT

## 2023-12-15 PROCEDURE — 99284 EMERGENCY DEPT VISIT MOD MDM: CPT

## 2023-12-15 RX ORDER — TRANEXAMIC ACID 650 MG/1
1300 TABLET ORAL 3 TIMES DAILY
Qty: 30 TABLET | Refills: 0 | Status: SHIPPED | OUTPATIENT
Start: 2023-12-15 | End: 2023-12-20

## 2023-12-15 RX ORDER — KETOROLAC TROMETHAMINE 15 MG/ML
15 INJECTION, SOLUTION INTRAMUSCULAR; INTRAVENOUS ONCE
Status: COMPLETED | OUTPATIENT
Start: 2023-12-15 | End: 2023-12-15

## 2023-12-15 NOTE — ED INITIAL ASSESSMENT (HPI)
Patent with vaginal bleeding for approx 30 days starting Nov 15. Last regular period in Oct. No birth control use. OB appt in Feb. US appt in one week. Friend did US on patient, showed cyst in uterus.

## 2024-01-10 ENCOUNTER — OFFICE VISIT (OUTPATIENT)
Dept: NEUROLOGY | Facility: CLINIC | Age: 44
End: 2024-01-10
Payer: COMMERCIAL

## 2024-01-10 VITALS
BODY MASS INDEX: 23.37 KG/M2 | HEART RATE: 73 BPM | OXYGEN SATURATION: 100 % | HEIGHT: 62 IN | DIASTOLIC BLOOD PRESSURE: 67 MMHG | SYSTOLIC BLOOD PRESSURE: 106 MMHG | WEIGHT: 127 LBS

## 2024-01-10 DIAGNOSIS — R26.89 BALANCE PROBLEM: Primary | ICD-10-CM

## 2024-01-10 PROCEDURE — 99213 OFFICE O/P EST LOW 20 MIN: CPT | Performed by: OTHER

## 2024-01-10 PROCEDURE — 3078F DIAST BP <80 MM HG: CPT | Performed by: OTHER

## 2024-01-10 PROCEDURE — 3074F SYST BP LT 130 MM HG: CPT | Performed by: OTHER

## 2024-01-10 PROCEDURE — 3008F BODY MASS INDEX DOCD: CPT | Performed by: OTHER

## 2024-01-10 RX ORDER — KETOCONAZOLE 20 MG/ML
SHAMPOO TOPICAL
COMMUNITY
Start: 2023-12-19

## 2024-01-10 NOTE — PATIENT INSTRUCTIONS
Refill policies:    Allow 2-3 business days for refills; controlled substances may take longer.  Contact your pharmacy at least 5 days prior to running out of medication and have them send an electronic request or submit request through the “request refill” option in your [employer] account.  Refills are not addressed on weekends; covering physicians do not authorize routine medications on weekends.  No narcotics or controlled substances are refilled after noon on Fridays or by on call physicians.  By law, narcotics must be electronically prescribed.  A 30 day supply with no refills is the maximum allowed.  If your prescription is due for a refill, you may be due for a follow up appointment.  To best provide you care, patients receiving routine medications need to be seen at least once a year.  Patients receiving narcotic/controlled substance medications need to be seen at least once every 3 months.  In the event that your preferred pharmacy does not have the requested medication in stock (e.g. Backordered), it is your responsibility to find another pharmacy that has the requested medication available.  We will gladly send a new prescription to that pharmacy at your request.    Scheduling Tests:    If your physician has ordered radiology tests such as MRI or CT scans, please contact Central Scheduling at [phone] right away to schedule the test.  Once scheduled, the [facility] Centralized Referral Team will work with your insurance carrier to obtain pre-certification or prior authorization.  Depending on your insurance carrier, approval may take 3-10 days.  It is highly recommended patients assure they have received an authorization before having a test performed.  If test is done without insurance authorization, patient may be responsible for the entire amount billed.      Precertification and Prior Authorizations:  If your physician has recommended that you have a procedure or additional testing performed the [facility]  Centralized Referral Team will contact your insurance carrier to obtain pre-certification or prior authorization.    You are strongly encouraged to contact your insurance carrier to verify that your procedure/test has been approved and is a COVERED benefit.  Although the [facility] Centralized Referral Team does its due diligence, the insurance carrier gives the disclaimer that \"Although the procedure is authorized, this does not guarantee payment.\"    Ultimately the patient is responsible for payment.   Thank you for your understanding in this matter.  Paperwork Completion:  If you require FMLA or disability paperwork for your recovery, please make sure to either drop it off or have it faxed to our office at [phone]. Be sure the form has your name and date of birth on it.  The form will be faxed to our Forms Department and they will complete it for you.  There is a 25$ fee for all forms that need to be filled out.  Please be aware there is a 10-14 day turnaround time.  You will need to sign a release of information (SUNITA) form if your paperwork does not come with one.  You may call the Forms Department with any questions at [phone].  Their fax number is [phone].

## 2024-01-10 NOTE — PROGRESS NOTES
NEUROLOGY  [facility]    Sondra Sanchez  2/26/1980  Primary Care Provider:  Messi Walton MD    1/10/2024  Accompanied visit:     (x) No.      43 year old yo,  was last seen on:: October for an episode of dizziness    Seen for:  Dizziness episode in Summer    Previous visit and existing record notes reviewed in preparation for the face to face visit.  Relevant labs and studies reviewed and will be noted in relevant areas of this record.      Present condition:  December 10, was just in family room watching TV and she got up and room spinning and weak that lasted 5-10 minutes    She was given a motion sickness pill and it helped, especially the nausea    Past History update/new problem(s): none    Review of Systems:  Review of Systems:  Denies systemic symptoms     No CP or SOB.  No GI or  symptoms. Relevant Neuro as noted above.      Medications:      Current Outpatient Medications:     ketoconazole 2 % External Shampoo, APPLY TO THE SCALP DAILY, Disp: , Rfl:     Albuterol-Budesonide 90-80 MCG/ACT Inhalation Aerosol, daily as needed., Disp: , Rfl:     levothyroxine 88 MCG Oral Tab, Take 77 mcg by mouth before breakfast., Disp: , Rfl:     ADVAIR DISKUS 250-50 MCG/DOSE Inhalation Aerosol Powder, Breath Activated, Inhale 1 puff into the lungs 2 (two) times daily., Disp: , Rfl:   PRN:     Allergies:  Allergies   Allergen Reactions    Aspirin SWELLING and PALPITATIONS    Naproxen OTHER (SEE COMMENTS)     GI upset, acidity, unable to eat for 3-4 days          EXAM:  /67   Pulse 73   Ht 62\"   Wt 127 lb (57.6 kg)   SpO2 100%   BMI 23.23 kg/m²   Looks stated age  General Exam:  HENT:  pink conjunctiva anicteric sclerae  Neck no adenopathy, thyroid normal  Heart and Lungs:  normal  Extremities: no cyanosis, skin changes    NEURO  NEURO  Able to relate events with fluent speech and intact comprehension  CN 2-12: pupils reactive, VF full face symmetric  sensation and movement tongue midline  No motor focal findings  Sensory: no lateralizing findings  Reflexes are symmetric  UMN signs: none  Gait: narrow based          INTERPRETATION of RELEVANT LABS and other DATA:          Problem/s Identified this visit:   1. Balance problem          Discussion plus Diagnostics & Treatment Orders:  Most likely vestibular cause doubt central;   Reassured    If with recurrence BRAIN and IAC MRI      (x) Discussed potential side effects of any treatment relevant to above.  Includes explanation of tests as necessary.    No follow-ups on file.      Patient understands that if needed, based on condition and or test results, follow up will be readjusted      Landen Moreno MD  Vascular & General Neurology  Director, Multiple Sclerosis Program  [facility]  1/10/2024, Time completed 2:39 PM    Decision making:  ( x ) labs reviewed/ordered - 1  (  ) new diagnosis: - 1  ( x) Images & studies independently reviewed -non F2F  (  ) Case/studies discussed with other caregivers - -non F2F  (  ) Telephone time with patiern or authorized [facility] member--non F2F  ( x ) other records reviewed --non F2F including consultations  (  ) [facility] meetings - patient not present --non F2F  (  ) Independent Historian obtained    Non Face to Face CPT code 15029/35224 applies as documented above    PROCEDURE DONE     (   ) see notes      After visit, patient was escorted out and handed-off discharge process and instructions to the check out desk.  No additional issues relevant to visit were raised to staff at this time interval.        This document is to be interpreted as my current opinion regarding the case as of the stated date of service based on the information available to me at this time and may supersedes any prior opinion expressed either orally or in writing.  Services rendered are only within the scope of direct medical care         GIB

## 2024-02-06 ENCOUNTER — TELEPHONE (OUTPATIENT)
Facility: CLINIC | Age: 44
End: 2024-02-06

## 2024-02-06 ENCOUNTER — OFFICE VISIT (OUTPATIENT)
Facility: CLINIC | Age: 44
End: 2024-02-06
Payer: COMMERCIAL

## 2024-02-06 VITALS
SYSTOLIC BLOOD PRESSURE: 114 MMHG | HEART RATE: 65 BPM | HEIGHT: 62 IN | DIASTOLIC BLOOD PRESSURE: 66 MMHG | BODY MASS INDEX: 23.89 KG/M2 | WEIGHT: 129.81 LBS

## 2024-02-06 DIAGNOSIS — N92.6 IRREGULAR BLEEDING: ICD-10-CM

## 2024-02-06 DIAGNOSIS — Z01.419 ENCOUNTER FOR WELL WOMAN EXAM WITH ROUTINE GYNECOLOGICAL EXAM: Primary | ICD-10-CM

## 2024-02-06 PROCEDURE — 99396 PREV VISIT EST AGE 40-64: CPT | Performed by: OBSTETRICS & GYNECOLOGY

## 2024-02-06 NOTE — PROGRESS NOTES
Sondra Sanchez is a 43 year old female  Patient's last menstrual period was 2024 (exact date).   Chief Complaint   Patient presents with    Wellness Visit     Last pap 20 neg     Other     Heavy & irregular periods, especially in Dec (was bleeding for a month)    .  Patient c/o menses becoming heavy with clots, bled whole month in November, had normal US 12/15/24  OBSTETRICS HISTORY:  OB History    Para Term  AB Living   3 3 3 0 0 3   SAB IAB Ectopic Multiple Live Births   0 0 0 0 3      # Outcome Date GA Lbr Star/2nd Weight Sex Delivery Anes PTL Lv   3 Term 16 39w3d  8 lb 5.5 oz (3.785 kg) F CS-LTranv Spinal N MARLA   2 Term 03/30/15 38w4d  9 lb 0.6 oz (4.1 kg) M CS-LTranv Spinal N MARLA   1 Term 2013 41w0d  7 lb 8 oz (3.402 kg) F CS-LTranv  N MARLA      Complications: Failure to progress       GYNE HISTORY:  Periods irregular heavy    History   Sexual Activity    Sexual activity: Yes    Partners: Male    Birth control/ protection: Tubal Ligation        Hx Prior Abnormal Pap: No  Pap Date: 20  Pap Result Notes: neg        MEDICAL HISTORY:  Past Medical History:   Diagnosis Date    Abnormal uterine bleeding 11/15/2023    Menorrahgia    Dyspareunia     Endometriosis     Endometritis 2013    postpartum    Extrinsic asthma, unspecified     Pt reports mild asthma, weather/dust related; no rescue inhaler    H/O postpartum hemorrhage, currently pregnant     History of blood transfusion 2013    postpartum    History of Papanicolaou smear of cervix 2014    Negative HPV Negative    Hyperlipidemia     Hypotension     Pt reports low BP & dizziness w/ current pregnancy    Unspecified hypothyroidism     Urinary incontinence 10/2023    Vertigo 2023       SURGICAL HISTORY:  Past Surgical History:   Procedure Laterality Date    Abdominal surgery  2018    Abdominoplasty Bilateral 2018    Hernia Repair & Rectus Diastasis      2013    Postpartum  hemorrhage & endometritis    Other      LAPROMOTMY AFTER LAST     Tubal ligation  2016       SOCIAL HISTORY:  Social History     Socioeconomic History    Marital status:      Spouse name: Not on file    Number of children: Not on file    Years of education: Not on file    Highest education level: Not on file   Occupational History    Occupation: homemaker   Tobacco Use    Smoking status: Never    Smokeless tobacco: Never   Vaping Use    Vaping Use: Never used   Substance and Sexual Activity    Alcohol use: No    Drug use: No    Sexual activity: Yes     Partners: Male     Birth control/protection: Tubal Ligation   Other Topics Concern    Caffeine Concern No    Exercise No    Seat Belt Yes    Special Diet No    Stress Concern Yes    Weight Concern Yes     Service Not Asked    Blood Transfusions Not Asked    Occupational Exposure Not Asked    Hobby Hazards Not Asked    Sleep Concern Yes     Comment: kids get up at night    Back Care Not Asked    Bike Helmet Not Asked    Self-Exams No   Social History Narrative    Not on file     Social Determinants of Health     Financial Resource Strain: Not on file   Food Insecurity: Not on file   Transportation Needs: Not on file   Physical Activity: Not on file   Stress: Not on file   Social Connections: Not on file   Housing Stability: Not on file       FAMILY HISTORY:  Family History   Problem Relation Age of Onset    Cancer Father         Liver    Hypertension Father     Lipids Father     Diabetes Mother     Hypertension Mother     Lipids Mother     Kidney Disease Maternal Grandmother     Diabetes Maternal Grandmother     Hypertension Maternal Grandfather     Diabetes Maternal Grandfather     Hypertension Paternal Grandmother     Diabetes Paternal Grandmother     Hypertension Paternal Grandfather     Other (Other[other]) Sister         hepatitis C       MEDICATIONS:    Current Outpatient Medications:     ketoconazole 2 % External Shampoo, APPLY TO  THE SCALP DAILY, Disp: , Rfl:     levothyroxine 88 MCG Oral Tab, Take 77 mcg by mouth before breakfast., Disp: , Rfl:     Albuterol-Budesonide 90-80 MCG/ACT Inhalation Aerosol, daily as needed. (Patient not taking: Reported on 2/6/2024), Disp: , Rfl:     ADVAIR DISKUS 250-50 MCG/DOSE Inhalation Aerosol Powder, Breath Activated, Inhale 1 puff into the lungs 2 (two) times daily. (Patient not taking: Reported on 2/6/2024), Disp: , Rfl:     ALLERGIES:    Allergies   Allergen Reactions    Aspirin SWELLING and PALPITATIONS    Naproxen OTHER (SEE COMMENTS)     GI upset, acidity, unable to eat for 3-4 days         Review of Systems:  Constitutional:  Denies fatigue, night sweats, hot flashes  Eyes:  denies blurred or double vision  Cardiovascular:  denies chest pain or palpitations  Respiratory:  denies shortness of breath  Gastrointestinal:  denies heartburn, abdominal pain, diarrhea or constipation  Genitourinary:  denies dysuria, incontinence, abnormal vaginal discharge, vaginal itching  Musculoskeletal:  denies back pain.  Skin/Breast:  Denies any breast pain, lumps, or discharge.   Neurological:  denies headaches, extremity weakness or numbness.  Psychiatric: denies depression or anxiety.  Endocrine:   denies excessive thirst or urination.  Heme/Lymph:  denies history of anemia, easy bruising or bleeding.      PHYSICAL EXAM:   Constitutional: well developed, well nourished  Head/Face: normocephalic  Neck/Thyroid: thyroid symmetric, no thyromegaly, no nodules, no adenopathy  Lymphatic:no abnormal supraclavicular or axillary adenopathy is noted  Breast: normal without palpable masses, tenderness, asymmetry, nipple discharge, nipple retraction or skin changes  Abdomen:  soft, nontender, nondistended, no masses  Skin/Hair: no unusual rashes or bruises  Extremities: no edema, no cyanosis  Psychiatric:  Oriented to time, place, person and situation. Appropriate mood and affect    Pelvic Exam:  External Genitalia: normal  appearance, hair distribution, and no lesions  Urethral Meatus:  normal in size, location, without lesions and prolapse  Bladder:  No fullness, masses or tenderness  Vagina:  Normal appearance without lesions, no abnormal discharge  Cervix:  Normal without tenderness on motion - blood clot at the os - unable to do pap smear today  Uterus: normal in size, contour, position, mobility, without tenderness  Adnexa: normal without masses or tenderness  Perineum: normal  Anus: no hemorroids       12/15/24 US    INDICATIONS:  DUB x30d with pelvic pain      TECHNIQUE:  Ultrasound of the pelvis was performed with a transvaginal and transabdominal probe.  Doppler evaluation of the ovaries was performed of the ovarian arteries and veins.  B-mode images, Doppler color flow, and spectral waveform analysis were   performed.  Transvaginal ultrasound was used to better evaluate adnexal and endometrial detail.      PATIENT STATED HISTORY: (As transcribed by Technologist)  Patient stated that she has had heavy bleeding with passing clots since 11/15/0223 with pelvic pain.          MEASUREMENTS:           Uterus Length:                      12.59 cm x 4.9 x 3.97 cm          Endometrium Thickness:      0.53 cm   Right Ovary:                         2.63 cm x 1.55 cm x 1.83 cm   Left Ovary:                           2.91 cm x 1.73 cm x 1.56 cm      FINDINGS:      PELVIS   UTERUS:  1.5 cm nabothian cyst in the cervix   RIGHT OVARY:  1.4 cm cyst more follicle   LEFT OVARY:  1.2 cm cyst or follicle    CUL-DE-SAC:  Small free fluid         Assessment & Plan:  Diagnoses and all orders for this visit:    Encounter for well woman exam with routine gynecological exam    Irregular bleeding    - advised hysteroscopy D&C, followed by OCP/IUD

## 2024-02-09 NOTE — TELEPHONE ENCOUNTER
Spoke with pt she would like to schedule surgery the 3rd week of March or at the end of April.Will call pt once those dates become available.

## 2024-03-19 DIAGNOSIS — N92.6 IRREGULAR BLEEDING: Primary | ICD-10-CM

## 2024-05-01 RX ORDER — LEVOTHYROXINE SODIUM 0.07 MG/1
75 TABLET ORAL
COMMUNITY

## 2024-05-03 ENCOUNTER — LABORATORY ENCOUNTER (OUTPATIENT)
Dept: LAB | Facility: HOSPITAL | Age: 44
End: 2024-05-03
Attending: OBSTETRICS & GYNECOLOGY
Payer: COMMERCIAL

## 2024-05-03 DIAGNOSIS — Z01.818 PRE-OP TESTING: ICD-10-CM

## 2024-05-03 LAB
ERYTHROCYTE [DISTWIDTH] IN BLOOD BY AUTOMATED COUNT: 13.8 %
HCT VFR BLD AUTO: 38.5 %
HGB BLD-MCNC: 12.7 G/DL
MCH RBC QN AUTO: 27.4 PG (ref 26–34)
MCHC RBC AUTO-ENTMCNC: 33 G/DL (ref 31–37)
MCV RBC AUTO: 83 FL
PLATELET # BLD AUTO: 339 10(3)UL (ref 150–450)
RBC # BLD AUTO: 4.64 X10(6)UL
WBC # BLD AUTO: 8.2 X10(3) UL (ref 4–11)

## 2024-05-03 PROCEDURE — 85027 COMPLETE CBC AUTOMATED: CPT

## 2024-05-03 PROCEDURE — 36415 COLL VENOUS BLD VENIPUNCTURE: CPT

## 2024-05-23 ENCOUNTER — ANESTHESIA EVENT (OUTPATIENT)
Dept: SURGERY | Facility: HOSPITAL | Age: 44
End: 2024-05-23

## 2024-05-23 NOTE — ANESTHESIA PREPROCEDURE EVALUATION
PRE-OP EVALUATION    Patient Name: Sondra Sanchez    Admit Diagnosis: Irregular bleeding [N92.6]    Pre-op Diagnosis: Irregular bleeding [N92.6]    HYSTEROSCOPY DILATION AND CURETTAGE    Anesthesia Procedure: HYSTEROSCOPY DILATION AND CURETTAGE    Surgeons and Role:     * Edyta Goldberg,  - Bakari    Pre-op vitals reviewed.        Body mass index is 22.86 kg/m².    Current medications reviewed.  Hospital Medications:  No current facility-administered medications on file as of .       Outpatient Medications:     No medications prior to admission.       Allergies: Aspirin and Naproxen      Anesthesia Evaluation    Patient summary reviewed.    Anesthetic Complications  (+) history of anesthetic complications  History of: PONV       GI/Hepatic/Renal                                 Cardiovascular                                                       Endo/Other           (+) hypothyroidism                       Pulmonary      (+) asthma                     Neuro/Psych          (+) CVA                    Anemia Cerebrovascular accident (CVA), unspecified mechanism (HCC)  H/O hernia repair Hypokalemia  Hypotension Hypothyroid  Meningioma (HCC) Mild tramadol abuse (HCC)  Numbness and tingling S/P   Skin sensation disturbance             Past Surgical History:   Procedure Laterality Date    Abdominal surgery  2018    Abdominoplasty Bilateral 2018    Hernia Repair & Rectus Diastasis      2013    Postpartum hemorrhage & endometritis    Hernia surgery      Other      LAPROMOTMY AFTER LAST     Tubal ligation  2016     Social History     Socioeconomic History    Marital status:    Occupational History    Occupation: homemaker   Tobacco Use    Smoking status: Never    Smokeless tobacco: Never   Vaping Use    Vaping status: Never Used   Substance and Sexual Activity    Alcohol use: No    Drug use: No    Sexual activity: Yes     Partners: Male     Birth control/protection:  Tubal Ligation   Other Topics Concern    Caffeine Concern No    Exercise No    Seat Belt Yes    Special Diet No    Stress Concern Yes    Weight Concern Yes    Sleep Concern Yes     Comment: kids get up at night    Self-Exams No     History   Drug Use No     Available pre-op labs reviewed.  Lab Results   Component Value Date    WBC 8.2 05/03/2024    RBC 4.64 05/03/2024    HGB 12.7 05/03/2024    HCT 38.5 05/03/2024    MCV 83.0 05/03/2024    MCH 27.4 05/03/2024    MCHC 33.0 05/03/2024    RDW 13.8 05/03/2024    .0 05/03/2024               Airway      Mallampati: I  Mouth opening: >3 FB  TM distance: > 6 cm  Neck ROM: full Cardiovascular    Cardiovascular exam normal.         Dental    Dentition appears grossly intact         Pulmonary    Pulmonary exam normal.                 Other findings              ASA: 3   Plan: general  NPO status verified and           Plan/risks discussed with: patient                Present on Admission:  **None**

## 2024-05-24 ENCOUNTER — ANESTHESIA (OUTPATIENT)
Dept: SURGERY | Facility: HOSPITAL | Age: 44
End: 2024-05-24

## 2024-05-24 ENCOUNTER — HOSPITAL ENCOUNTER (OUTPATIENT)
Facility: HOSPITAL | Age: 44
Setting detail: HOSPITAL OUTPATIENT SURGERY
Discharge: HOME | End: 2024-05-24
Attending: OBSTETRICS & GYNECOLOGY | Admitting: OBSTETRICS & GYNECOLOGY

## 2024-05-24 VITALS
HEART RATE: 48 BPM | TEMPERATURE: 98 F | WEIGHT: 128.38 LBS | DIASTOLIC BLOOD PRESSURE: 60 MMHG | RESPIRATION RATE: 18 BRPM | HEIGHT: 62 IN | OXYGEN SATURATION: 99 % | BODY MASS INDEX: 23.62 KG/M2 | SYSTOLIC BLOOD PRESSURE: 97 MMHG

## 2024-05-24 DIAGNOSIS — Z01.818 PRE-OP TESTING: Primary | ICD-10-CM

## 2024-05-24 DIAGNOSIS — N92.6 IRREGULAR BLEEDING: ICD-10-CM

## 2024-05-24 PROBLEM — N93.9 ABNORMAL UTERINE BLEEDING (AUB): Status: ACTIVE | Noted: 2024-05-24

## 2024-05-24 PROBLEM — D25.0 FIBROIDS, SUBMUCOSAL: Status: ACTIVE | Noted: 2024-05-24

## 2024-05-24 LAB — B-HCG UR QL: NEGATIVE

## 2024-05-24 PROCEDURE — 58561 HYSTEROSCOPY REMOVE MYOMA: CPT | Performed by: OBSTETRICS & GYNECOLOGY

## 2024-05-24 PROCEDURE — 0UB98ZZ EXCISION OF UTERUS, VIA NATURAL OR ARTIFICIAL OPENING ENDOSCOPIC: ICD-10-PCS | Performed by: OBSTETRICS & GYNECOLOGY

## 2024-05-24 RX ORDER — ACETAMINOPHEN 500 MG
1000 TABLET ORAL ONCE
Status: DISCONTINUED | OUTPATIENT
Start: 2024-05-24 | End: 2024-05-24 | Stop reason: HOSPADM

## 2024-05-24 RX ORDER — HYDROMORPHONE HYDROCHLORIDE 1 MG/ML
0.4 INJECTION, SOLUTION INTRAMUSCULAR; INTRAVENOUS; SUBCUTANEOUS EVERY 5 MIN PRN
Status: DISCONTINUED | OUTPATIENT
Start: 2024-05-24 | End: 2024-05-24

## 2024-05-24 RX ORDER — NALOXONE HYDROCHLORIDE 0.4 MG/ML
0.08 INJECTION, SOLUTION INTRAMUSCULAR; INTRAVENOUS; SUBCUTANEOUS AS NEEDED
Status: DISCONTINUED | OUTPATIENT
Start: 2024-05-24 | End: 2024-05-24

## 2024-05-24 RX ORDER — HYDROMORPHONE HYDROCHLORIDE 1 MG/ML
0.2 INJECTION, SOLUTION INTRAMUSCULAR; INTRAVENOUS; SUBCUTANEOUS EVERY 5 MIN PRN
Status: DISCONTINUED | OUTPATIENT
Start: 2024-05-24 | End: 2024-05-24

## 2024-05-24 RX ORDER — PROCHLORPERAZINE EDISYLATE 5 MG/ML
5 INJECTION INTRAMUSCULAR; INTRAVENOUS EVERY 8 HOURS PRN
Status: DISCONTINUED | OUTPATIENT
Start: 2024-05-24 | End: 2024-05-24

## 2024-05-24 RX ORDER — HYDROCODONE BITARTRATE AND ACETAMINOPHEN 5; 325 MG/1; MG/1
2 TABLET ORAL ONCE AS NEEDED
Status: DISCONTINUED | OUTPATIENT
Start: 2024-05-24 | End: 2024-05-24

## 2024-05-24 RX ORDER — MIDAZOLAM HYDROCHLORIDE 1 MG/ML
INJECTION INTRAMUSCULAR; INTRAVENOUS AS NEEDED
Status: DISCONTINUED | OUTPATIENT
Start: 2024-05-24 | End: 2024-05-24 | Stop reason: SURG

## 2024-05-24 RX ORDER — HYDROCODONE BITARTRATE AND ACETAMINOPHEN 5; 325 MG/1; MG/1
1 TABLET ORAL ONCE AS NEEDED
Status: DISCONTINUED | OUTPATIENT
Start: 2024-05-24 | End: 2024-05-24

## 2024-05-24 RX ORDER — ONDANSETRON 2 MG/ML
INJECTION INTRAMUSCULAR; INTRAVENOUS AS NEEDED
Status: DISCONTINUED | OUTPATIENT
Start: 2024-05-24 | End: 2024-05-24 | Stop reason: SURG

## 2024-05-24 RX ORDER — HYDROMORPHONE HYDROCHLORIDE 1 MG/ML
0.6 INJECTION, SOLUTION INTRAMUSCULAR; INTRAVENOUS; SUBCUTANEOUS EVERY 5 MIN PRN
Status: DISCONTINUED | OUTPATIENT
Start: 2024-05-24 | End: 2024-05-24

## 2024-05-24 RX ORDER — DEXAMETHASONE SODIUM PHOSPHATE 4 MG/ML
VIAL (ML) INJECTION AS NEEDED
Status: DISCONTINUED | OUTPATIENT
Start: 2024-05-24 | End: 2024-05-24 | Stop reason: SURG

## 2024-05-24 RX ORDER — SODIUM CHLORIDE, SODIUM LACTATE, POTASSIUM CHLORIDE, CALCIUM CHLORIDE 600; 310; 30; 20 MG/100ML; MG/100ML; MG/100ML; MG/100ML
INJECTION, SOLUTION INTRAVENOUS CONTINUOUS
Status: DISCONTINUED | OUTPATIENT
Start: 2024-05-24 | End: 2024-05-24

## 2024-05-24 RX ORDER — ONDANSETRON 2 MG/ML
4 INJECTION INTRAMUSCULAR; INTRAVENOUS EVERY 6 HOURS PRN
Status: DISCONTINUED | OUTPATIENT
Start: 2024-05-24 | End: 2024-05-24

## 2024-05-24 RX ORDER — ACETAMINOPHEN 500 MG
1000 TABLET ORAL ONCE AS NEEDED
Status: DISCONTINUED | OUTPATIENT
Start: 2024-05-24 | End: 2024-05-24

## 2024-05-24 RX ORDER — EPHEDRINE SULFATE 50 MG/ML
INJECTION INTRAVENOUS AS NEEDED
Status: DISCONTINUED | OUTPATIENT
Start: 2024-05-24 | End: 2024-05-24 | Stop reason: SURG

## 2024-05-24 RX ORDER — SCOLOPAMINE TRANSDERMAL SYSTEM 1 MG/1
1 PATCH, EXTENDED RELEASE TRANSDERMAL ONCE
Status: DISCONTINUED | OUTPATIENT
Start: 2024-05-24 | End: 2024-05-24 | Stop reason: HOSPADM

## 2024-05-24 RX ORDER — KETOROLAC TROMETHAMINE 30 MG/ML
INJECTION, SOLUTION INTRAMUSCULAR; INTRAVENOUS AS NEEDED
Status: DISCONTINUED | OUTPATIENT
Start: 2024-05-24 | End: 2024-05-24 | Stop reason: SURG

## 2024-05-24 RX ADMIN — SODIUM CHLORIDE, SODIUM LACTATE, POTASSIUM CHLORIDE, CALCIUM CHLORIDE: 600; 310; 30; 20 INJECTION, SOLUTION INTRAVENOUS at 08:37:00

## 2024-05-24 RX ADMIN — ONDANSETRON 4 MG: 2 INJECTION INTRAMUSCULAR; INTRAVENOUS at 08:45:00

## 2024-05-24 RX ADMIN — EPHEDRINE SULFATE 5 MG: 50 INJECTION INTRAVENOUS at 08:56:00

## 2024-05-24 RX ADMIN — KETOROLAC TROMETHAMINE 15 MG: 30 INJECTION, SOLUTION INTRAMUSCULAR; INTRAVENOUS at 08:47:00

## 2024-05-24 RX ADMIN — SODIUM CHLORIDE, SODIUM LACTATE, POTASSIUM CHLORIDE, CALCIUM CHLORIDE: 600; 310; 30; 20 INJECTION, SOLUTION INTRAVENOUS at 09:16:00

## 2024-05-24 RX ADMIN — DEXAMETHASONE SODIUM PHOSPHATE 4 MG: 4 MG/ML VIAL (ML) INJECTION at 08:45:00

## 2024-05-24 RX ADMIN — KETOROLAC TROMETHAMINE 15 MG: 30 INJECTION, SOLUTION INTRAMUSCULAR; INTRAVENOUS at 09:08:00

## 2024-05-24 RX ADMIN — MIDAZOLAM HYDROCHLORIDE 2 MG: 1 INJECTION INTRAMUSCULAR; INTRAVENOUS at 08:37:00

## 2024-05-24 NOTE — H&P
Sondra Sanchez is a 44 year old female  Patient's last menstrual period was 2024 (within days). No chief complaint on file.  .  Patient c/o menses becoming heavier with clots, bled all month in november  OBSTETRICS HISTORY:  OB History    Para Term  AB Living   3 3 3 0 0 3   SAB IAB Ectopic Multiple Live Births   0 0 0 0 3      # Outcome Date GA Lbr Star/2nd Weight Sex Type Anes PTL Lv   3 Term 16 39w3d  8 lb 5.5 oz (3.785 kg) F CS-LTranv Spinal N MARLA   2 Term 03/30/15 38w4d  9 lb 0.6 oz (4.1 kg) M CS-LTranv Spinal N MARLA   1 Term 2013 41w0d  7 lb 8 oz (3.402 kg) F CS-LTranv  N MARLA      Complications: Failure to progress       GYNE HISTORY:  Periods irregular heavy    History   Sexual Activity    Sexual activity: Yes    Partners: Male    Birth control/ protection: Tubal Ligation                 MEDICAL HISTORY:  Past Medical History:    Abnormal uterine bleeding    Menorrahgia    Arrhythmia    Asthma ([facility])    Back problem    Disorder of thyroid    Dyspareunia    Endometriosis    Endometritis    postpartum    Extrinsic asthma, unspecified    Pt reports mild asthma, weather/dust related; no rescue inhaler    H/O postpartum hemorrhage, currently pregnant (HCC)    History of blood transfusion    postpartum    History of Papanicolaou smear of cervix    Negative HPV Negative    Hyperlipidemia    Hypotension    Pt reports low BP & dizziness w/ current pregnancy    Migraines    Osteoarthritis    PONV (postoperative nausea and vomiting)    nausea    Stroke ([facility])    Unspecified hypothyroidism    Urinary incontinence    Vertigo    Visual impairment    glasses       SURGICAL HISTORY:  Past Surgical History:   Procedure Laterality Date    Abdominal surgery  2018    Abdominoplasty Bilateral 2018    Hernia Repair & Rectus Diastasis      2013    Postpartum hemorrhage & endometritis    Hernia surgery      Other      LAPROMOTMY AFTER LAST     Tubal ligation   08/11/2016       SOCIAL HISTORY:  Social History     Socioeconomic History    Marital status:      Spouse name: Not on file    Number of children: Not on file    Years of education: Not on file    Highest education level: Not on file   Occupational History    Occupation: homemaker   Tobacco Use    Smoking status: Never    Smokeless tobacco: Never   Vaping Use    Vaping status: Never Used   Substance and Sexual Activity    Alcohol use: No    Drug use: No    Sexual activity: Yes     Partners: Male     Birth control/protection: Tubal Ligation   Other Topics Concern    Caffeine Concern No    Exercise No    Seat Belt Yes    Special Diet No    Stress Concern Yes    Weight Concern Yes     Service Not Asked    Blood Transfusions Not Asked    Occupational Exposure Not Asked    Hobby Hazards Not Asked    Sleep Concern Yes     Comment: kids get up at night    Back Care Not Asked    Bike Helmet Not Asked    Self-Exams No   Social History Narrative    Not on file     Social Determinants of Health     Financial Resource Strain: Not on file   Food Insecurity: Not on file   Transportation Needs: Not on file   Physical Activity: Not on file   Stress: Not on file   Social Connections: Not on file   Housing Stability: Not on file       FAMILY HISTORY:  Family History   Problem Relation Age of Onset    Cancer Father         Liver    Hypertension Father     Lipids Father     Diabetes Mother     Hypertension Mother     Lipids Mother     Kidney Disease Maternal Grandmother     Diabetes Maternal Grandmother     Hypertension Maternal Grandfather     Diabetes Maternal Grandfather     Hypertension Paternal Grandmother     Diabetes Paternal Grandmother     Hypertension Paternal Grandfather     Other (Other[other]) Sister         hepatitis C       MEDICATIONS:  No current outpatient medications on file.    ALLERGIES:    Allergies   Allergen Reactions    Aspirin SWELLING and PALPITATIONS    Naproxen OTHER (SEE COMMENTS)     GI  upset, acidity, unable to eat for 3-4 days         Review of Systems:  Constitutional:  Denies fatigue, night sweats, hot flashes  Eyes:  denies blurred or double vision  Cardiovascular:  denies chest pain or palpitations  Respiratory:  denies shortness of breath  Gastrointestinal:  denies heartburn, abdominal pain, diarrhea or constipation  Genitourinary:  denies dysuria, incontinence, abnormal vaginal discharge, vaginal itching  Musculoskeletal:  denies back pain.  Skin/Breast:  Denies any breast pain, lumps, or discharge.   Neurological:  denies headaches, extremity weakness or numbness.  Psychiatric: denies depression or anxiety.  Endocrine:   denies excessive thirst or urination.  Heme/Lymph:  denies history of anemia, easy bruising or bleeding.      PHYSICAL EXAM:   Constitutional: well developed, well nourished  Head/Face: normocephalic  Neck/Thyroid: thyroid symmetric, no thyromegaly, no nodules, no adenopathy  Lymphatic:no abnormal supraclavicular or axillary adenopathy is noted  Breast: normal without palpable masses, tenderness, asymmetry, nipple discharge, nipple retraction or skin changes  Abdomen:  soft, nontender, nondistended, no masses  Skin/Hair: no unusual rashes or bruises  Extremities: no edema, no cyanosis  Psychiatric:  Oriented to time, place, person and situation. Appropriate mood and affect    Pelvic Exam:  External Genitalia: normal appearance, hair distribution, and no lesions  Urethral Meatus:  normal in size, location, without lesions and prolapse  Bladder:  No fullness, masses or tenderness  Vagina:  Normal appearance without lesions, no abnormal discharge  Cervix:  Normal without tenderness on motion  Uterus: normal in size, contour, position, mobility, without tenderness  Adnexa: normal without masses or tenderness  Perineum: normal  Anus: no hemorroids     Assessment & Plan:  Diagnoses and all orders for this visit:    AUB  - hysteroscopy D&C

## 2024-05-24 NOTE — DISCHARGE INSTRUCTIONS
You received a drug called Toradol which is an Anti Inflammatory at:  9:00 am  If you are allowed to take Anti inflammatories:    Do not take any Anti Inflammatory like Motrin, Aleve or Ibuprophen until after:  3:00 pm  Please report any suspected allergic reactions or bleeding issues to your doctor                                                                                                         Home Care Instructions  DILATATION AND CURETTAGE (D&C)      1. Take it easy for the first 24 hours. Stay at home if possible and make sure someone is at home to help you or to report any symptoms or problems that you might have.    2. You may feel weak, dizzy, or a little lightheaded from the anesthesia during the first 24 hours. If so, stay in bed and rest and do not climb up and down the stairs. If you cannot completely avoid stairs, make sure that you have assistance.    3. Do not drive for 24 hours after surgery.    4. You may have spotting or discharge for the next few days. However, any heavy bleeding is abnormal and should be reported immediately.    5. Take you temperature once a day for the next 7 days and report to us if your temperature is up to 101 degrees or more in the absence of any common cold symptoms.    6. You may have some cramping, lower abdominal pains or shoulder pain for the first 24 hours. Usually two aspirin, Advil Ibuprofen or Tylenol every 4-6 hours takes care of this problem. If the pain persists or gets worse, notify the doctor immediately.    7. Avoid douching or intercourse for ten days. You may take a shower but do not take a bath for 10 days.    8. Please call in advance and make your appointment for a post-operative checkup at the office in 3-4 week.    9. Please do not hesitate to call if you have any problems or questions        [phone]

## 2024-05-24 NOTE — ANESTHESIA POSTPROCEDURE EVALUATION
[facility] Patient Status:  Hospital Outpatient Surgery   Age/Gender 44 year old female MRN [id number]   Location [facility] SURGERY Attending Edyta Goldberg DO   Hosp Day # 0 PCP Messi Walton MD       Anesthesia Post-op Note    HYSTEROSCOPY MYOMECTOMY    Procedure Summary       Date: 05/24/24 Room / Location:  MAIN OR 14 /  MAIN OR    Anesthesia Start: 0837 Anesthesia Stop: 0923    Procedure: HYSTEROSCOPY MYOMECTOMY (Vagina ) Diagnosis:       Irregular bleeding      (Irregular bleeding [N92.6])    Surgeons: Edyta Goldberg DO Anesthesiologist: Carlos A Damian MD    Anesthesia Type: general ASA Status: 3            Anesthesia Type: general    Vitals Value Taken Time   BP 97/55 05/24/24 0923   Temp 98.4 05/24/24 0923   Pulse 69 05/24/24 0923   Resp 12 05/24/24 0923   SpO2 100 05/24/24 0923       Patient Location: Same Day Surgery    Anesthesia Type: MAC    Airway Patency: patent and extubated    Postop Pain Control: adequate    Mental Status: preanesthetic baseline    Nausea/Vomiting: none    Cardiopulmonary/Hydration status: stable euvolemic    Complications: no apparent anesthesia related complications    Postop vital signs: stable    Dental Exam: Unchanged from Preop    Patient to be discharged from PACU when criteria met.

## 2024-05-24 NOTE — OPERATIVE REPORT
Pre-op Dx:AUB  Post-op Dx:same, submucosal fibroid  Surgeon:   Procedure: Hysteroscopy myomectomy  Complications: none  Findings:submucosal fibroid, anterior uterine wall  Procedure note: Pt was taken to the O.R. Where anesthesia was obtained without difficulty. She was then prepped and draped in normal sterile fashion in dorsal lithotomy position. Weighted speculum was placed in patients vagina and single tooth tenaculum applied to anterior lip of the cervix. Cervix was then gently dilated, uterus sounded to be 7 cm, and hysteroscope introduced with findings above.Myomectomy done with True Clear, all instruments removed from patients vagina, good hemostasis noted.

## 2024-06-06 ENCOUNTER — OFFICE VISIT (OUTPATIENT)
Facility: CLINIC | Age: 44
End: 2024-06-06
Payer: COMMERCIAL

## 2024-06-06 VITALS
DIASTOLIC BLOOD PRESSURE: 66 MMHG | BODY MASS INDEX: 24.11 KG/M2 | WEIGHT: 131 LBS | HEIGHT: 62 IN | SYSTOLIC BLOOD PRESSURE: 100 MMHG | HEART RATE: 67 BPM

## 2024-06-06 DIAGNOSIS — Z09 POSTOP CHECK: Primary | ICD-10-CM

## 2024-06-06 PROCEDURE — 99213 OFFICE O/P EST LOW 20 MIN: CPT | Performed by: OBSTETRICS & GYNECOLOGY

## 2024-06-06 NOTE — PROGRESS NOTES
Sondra Sanchez is a 44 year old female  Patient's last menstrual period was 2024 (exact date).   Chief Complaint   Patient presents with    Follow - Up     HYSTEROSCOPY MYOMECTOMY   .  Patient has no complaints, had normal period after the procedure  OBSTETRICS HISTORY:  OB History    Para Term  AB Living   3 3 3 0 0 3   SAB IAB Ectopic Multiple Live Births   0 0 0 0 3      # Outcome Date GA Lbr Star/2nd Weight Sex Type Anes PTL Lv   3 Term 16 39w3d  8 lb 5.5 oz (3.785 kg) F CS-LTranv Spinal N MARLA   2 Term 03/30/15 38w4d  9 lb 0.6 oz (4.1 kg) M CS-LTranv Spinal N MARLA   1 Term 2013 41w0d  7 lb 8 oz (3.402 kg) F CS-LTranv  N MARLA      Complications: Failure to progress       GYNE HISTORY:  Periods regular monthly    History   Sexual Activity    Sexual activity: Not Currently    Partners: Male    Birth control/ protection: Tubal Ligation        Pap Date: 20  Pap Result Notes: neg/neg        MEDICAL HISTORY:  Past Medical History:    Abnormal uterine bleeding    Menorrahgia    Arrhythmia    Asthma (HCC)    Back problem    Disorder of thyroid    Dyspareunia    Endometriosis    Endometritis    postpartum    Extrinsic asthma, unspecified    Pt reports mild asthma, weather/dust related; no rescue inhaler    H/O postpartum hemorrhage, currently pregnant (HCC)    History of blood transfusion    postpartum    History of Papanicolaou smear of cervix    Negative HPV Negative    Hyperlipidemia    Hypotension    Pt reports low BP & dizziness w/ current pregnancy    Migraines    Osteoarthritis    PONV (postoperative nausea and vomiting)    nausea    Stroke ([facility])    Unspecified hypothyroidism    Urinary incontinence    Vertigo    Visual impairment    glasses       SURGICAL HISTORY:  Past Surgical History:   Procedure Laterality Date    Abdominal surgery  2018    Abdominoplasty Bilateral 2018    Hernia Repair & Rectus Diastasis      2013    Postpartum hemorrhage &  endometritis    Hernia surgery      Other      LAPROMOTMY AFTER LAST     Tubal ligation  2016       SOCIAL HISTORY:  Social History     Socioeconomic History    Marital status:      Spouse name: Not on file    Number of children: Not on file    Years of education: Not on file    Highest education level: Not on file   Occupational History    Occupation: homemaker   Tobacco Use    Smoking status: Never    Smokeless tobacco: Never   Vaping Use    Vaping status: Never Used   Substance and Sexual Activity    Alcohol use: No    Drug use: No    Sexual activity: Not Currently     Partners: Male     Birth control/protection: Tubal Ligation   Other Topics Concern    Caffeine Concern No    Exercise No    Seat Belt Yes    Special Diet No    Stress Concern Yes    Weight Concern Yes     Service Not Asked    Blood Transfusions Not Asked    Occupational Exposure Not Asked    Hobby Hazards Not Asked    Sleep Concern Yes     Comment: kids get up at night    Back Care Not Asked    Bike Helmet Not Asked    Self-Exams No   Social History Narrative    Not on file     Social Determinants of Health     Financial Resource Strain: Not on file   Food Insecurity: Not on file   Transportation Needs: Not on file   Physical Activity: Not on file   Stress: Not on file   Social Connections: Not on file   Housing Stability: Not on file       FAMILY HISTORY:  Family History   Problem Relation Age of Onset    Cancer Father         Liver    Hypertension Father     Lipids Father     Diabetes Mother     Hypertension Mother     Lipids Mother     Kidney Disease Maternal Grandmother     Diabetes Maternal Grandmother     Hypertension Maternal Grandfather     Diabetes Maternal Grandfather     Hypertension Paternal Grandmother     Diabetes Paternal Grandmother     Hypertension Paternal Grandfather     Other (Other[other]) Sister         hepatitis C       MEDICATIONS:    Current Outpatient Medications:     levothyroxine 75 MCG Oral  Tab, Take 1 tablet (75 mcg total) by mouth before breakfast., Disp: , Rfl:     ketoconazole 2 % External Shampoo, twice a week., Disp: , Rfl:     Albuterol-Budesonide 90-80 MCG/ACT Inhalation Aerosol, daily as needed., Disp: , Rfl:     ADVAIR DISKUS 250-50 MCG/DOSE Inhalation Aerosol Powder, Breath Activated, Inhale 1 puff into the lungs 2 (two) times daily., Disp: , Rfl:     ALLERGIES:    Allergies   Allergen Reactions    Aspirin SWELLING and PALPITATIONS    Naproxen OTHER (SEE COMMENTS)     GI upset, acidity, unable to eat for 3-4 days         PHYSICAL EXAM:   Pelvic Exam:  External Genitalia: normal appearance, hair distribution, and no lesions  Urethral Meatus:  normal in size, location, without lesions and prolapse  Bladder:  No fullness, masses or tenderness  Vagina:  Normal appearance without lesions, no abnormal discharge  Cervix:  Normal without tenderness on motion  Uterus: normal in size, contour, position, mobility, without tenderness  Adnexa: normal without masses or tenderness  Perineum: normal  Anus: no hemorroids     Assessment & Plan:  1. Postop check  - doing well  - return for annual

## 2024-12-03 ENCOUNTER — HOSPITAL ENCOUNTER (OUTPATIENT)
Dept: MAMMOGRAPHY | Age: 44
Discharge: HOME | End: 2024-12-03
Attending: SPECIALIST
Payer: COMMERCIAL

## 2024-12-03 DIAGNOSIS — Z12.31 ENCOUNTER FOR SCREENING MAMMOGRAM FOR MALIGNANT NEOPLASM OF BREAST: ICD-10-CM

## 2024-12-03 PROCEDURE — 77067 SCR MAMMO BI INCL CAD: CPT | Performed by: SPECIALIST

## 2024-12-03 PROCEDURE — 77063 BREAST TOMOSYNTHESIS BI: CPT | Performed by: SPECIALIST

## 2025-05-06 ENCOUNTER — HOSPITAL ENCOUNTER (OUTPATIENT)
Dept: CT IMAGING | Age: 45
Discharge: HOME | End: 2025-05-06
Attending: SPECIALIST

## 2025-05-06 DIAGNOSIS — Z13.6 SCREENING FOR CARDIOVASCULAR CONDITION: ICD-10-CM

## 2025-07-28 ENCOUNTER — LAB ENCOUNTER (OUTPATIENT)
Dept: LAB | Age: 45
End: 2025-07-28
Attending: OBSTETRICS & GYNECOLOGY
Payer: COMMERCIAL

## 2025-07-28 ENCOUNTER — OFFICE VISIT (OUTPATIENT)
Facility: CLINIC | Age: 45
End: 2025-07-28
Payer: COMMERCIAL

## 2025-07-28 VITALS
BODY MASS INDEX: 24.33 KG/M2 | DIASTOLIC BLOOD PRESSURE: 72 MMHG | WEIGHT: 132.19 LBS | SYSTOLIC BLOOD PRESSURE: 106 MMHG | HEART RATE: 69 BPM | HEIGHT: 62 IN

## 2025-07-28 DIAGNOSIS — Z12.31 BREAST CANCER SCREENING BY MAMMOGRAM: ICD-10-CM

## 2025-07-28 DIAGNOSIS — N92.6 IRREGULAR BLEEDING: ICD-10-CM

## 2025-07-28 DIAGNOSIS — Z12.4 CERVICAL CANCER SCREENING: ICD-10-CM

## 2025-07-28 DIAGNOSIS — Z01.419 ENCOUNTER FOR WELL WOMAN EXAM WITH ROUTINE GYNECOLOGICAL EXAM: Primary | ICD-10-CM

## 2025-07-28 LAB
FSH SERPL-ACNC: 12.6 MIU/ML
LH SERPL-ACNC: 5.4 MIU/ML

## 2025-07-28 PROCEDURE — 83001 ASSAY OF GONADOTROPIN (FSH): CPT | Performed by: OBSTETRICS & GYNECOLOGY

## 2025-07-28 PROCEDURE — 88175 CYTOPATH C/V AUTO FLUID REDO: CPT | Performed by: OBSTETRICS & GYNECOLOGY

## 2025-07-28 PROCEDURE — 36415 COLL VENOUS BLD VENIPUNCTURE: CPT | Performed by: OBSTETRICS & GYNECOLOGY

## 2025-07-28 PROCEDURE — 87624 HPV HI-RISK TYP POOLED RSLT: CPT | Performed by: OBSTETRICS & GYNECOLOGY

## 2025-07-28 PROCEDURE — 83002 ASSAY OF GONADOTROPIN (LH): CPT | Performed by: OBSTETRICS & GYNECOLOGY

## 2025-07-28 RX ORDER — OMEPRAZOLE 40 MG/1
CAPSULE, DELAYED RELEASE ORAL
COMMUNITY
Start: 2025-07-15

## 2025-07-28 NOTE — PROGRESS NOTES
Sondra Sanchez is a 45 year old female  Patient's last menstrual period was 2025 (approximate).   Chief Complaint   Patient presents with    Wellness Visit     Last pap 20 neg     Gyn Problem     Discuss irregular periods, last was in February   Having some on/off abdominal pain for about two months now    .  Patient had last normal period 2025. She has occasional spotting, her PCP prescribed provera every 3 months. Patient feels very stressed and irritable, diagnosed with PVCs that she feels . Her PCP suggested anxiety medication, patient has not started taking yet- advised to follow recommendation   Patient had blood work done with PCP couple months ago  OBSTETRICS HISTORY:  OB History    Para Term  AB Living   3 3 3 0 0 3   SAB IAB Ectopic Multiple Live Births   0 0 0  3      # Outcome Date GA Lbr Star/2nd Weight Sex Type Anes PTL Lv   3 Term 16 39w3d  8 lb 5.5 oz (3.785 kg) F CS-LTranv Spinal N MARLA   2 Term 03/30/15 38w4d  9 lb 0.6 oz (4.1 kg) M CS-LTranv Spinal N MARLA   1 Term 2013 41w0d  7 lb 8 oz (3.402 kg) F CS-LTranv  N MARLA      Complications: Failure to progress, Failure to Progress in First Stage       GYNE HISTORY:  Periods spotting only    History   Sexual Activity    Sexual activity: Yes    Partners: Male    Birth control/ protection: Tubal Ligation        Hx Prior Abnormal Pap: No  Pap Date: 20  Pap Result Notes: neg/neg        MEDICAL HISTORY:  Past Medical History[1]    SURGICAL HISTORY:  Past Surgical History[2]    SOCIAL HISTORY:  Social History     Socioeconomic History    Marital status:      Spouse name: Not on file    Number of children: Not on file    Years of education: Not on file    Highest education level: Not on file   Occupational History    Occupation: homemaker   Tobacco Use    Smoking status: Never    Smokeless tobacco: Never   Vaping Use    Vaping status: Never Used   Substance and Sexual Activity    Alcohol use: Never     Drug use: Never    Sexual activity: Yes     Partners: Male     Birth control/protection: Tubal Ligation   Other Topics Concern    Caffeine Concern No    Exercise No    Seat Belt No    Special Diet No    Stress Concern Yes    Weight Concern Yes     Service Not Asked    Blood Transfusions Not Asked    Occupational Exposure Not Asked    Hobby Hazards Not Asked    Sleep Concern Yes     Comment: kids get up at night    Back Care Not Asked    Bike Helmet Not Asked    Self-Exams No   Social History Narrative    Not on file     Social Drivers of Health     Food Insecurity: Not on file   Transportation Needs: Not on file   Stress: Not on file   Housing Stability: Not on file       FAMILY HISTORY:  Family History[3]    MEDICATIONS:  Medications - Current[4]    ALLERGIES:  Allergies[5]      Review of Systems:  Constitutional:  Denies fatigue, night sweats, hot flashes  Eyes:  denies blurred or double vision  Cardiovascular:  denies chest pain or palpitations  Respiratory:  denies shortness of breath  Gastrointestinal:  denies heartburn, abdominal pain, diarrhea or constipation  Genitourinary:  denies dysuria, incontinence, abnormal vaginal discharge, vaginal itching  Musculoskeletal:  denies back pain.  Skin/Breast:  Denies any breast pain, lumps, or discharge.   Neurological:  denies headaches, extremity weakness or numbness.  Psychiatric: denies depression or anxiety.  Endocrine:   denies excessive thirst or urination.  Heme/Lymph:  denies history of anemia, easy bruising or bleeding.      PHYSICAL EXAM:   Constitutional: well developed, well nourished  Head/Face: normocephalic  Neck/Thyroid: thyroid symmetric, no thyromegaly, no nodules, no adenopathy  Lymphatic:no abnormal supraclavicular or axillary adenopathy is noted  Breast: normal without palpable masses, tenderness, asymmetry, nipple discharge, nipple retraction or skin changes  Abdomen:  soft, nontender, nondistended, no masses  Skin/Hair: no unusual rashes  or bruises  Extremities: no edema, no cyanosis  Psychiatric:  Oriented to time, place, person and situation. Appropriate mood and affect    Pelvic Exam:  External Genitalia: normal appearance, hair distribution, and no lesions  Urethral Meatus:  normal in size, location, without lesions and prolapse  Bladder:  No fullness, masses or tenderness  Vagina:  Normal appearance without lesions, no abnormal discharge  Cervix:  Normal without tenderness on motion  Uterus: normal in size, contour, position, mobility, without tenderness  Adnexa: normal without masses or tenderness  Perineum: normal  Anus: no hemorroids     Assessment & Plan:  Diagnoses and all orders for this visit:    Encounter for well woman exam with routine gynecological exam    Cervical cancer screening  -     Hpv High Risk , Thin Prep Collect  -     ThinPrep PAP Smear B    Breast cancer screening by mammogram  -     [facility] ABNER 2D+3D SCREENING BILAT (CPT=77067/03192); Future    Irregular bleeding  -     FSH AND LH [7137] [Q]  -     Pelvic US Complete GYNE Only [63091/88493]; Future                   [1]   Past Medical History:   Abnormal uterine bleeding    Menorrahgia    Amenorrhea    Arrhythmia    Asthma (HCC)    Back problem    Disorder of thyroid    Dyspareunia    Endometriosis    Endometritis    postpartum    Extrinsic asthma, unspecified    Pt reports mild asthma, weather/dust related; no rescue inhaler    Fibroids    H/O postpartum hemorrhage, currently pregnant (HCC)    Heart murmur    History of blood transfusion    postpartum    History of Papanicolaou smear of cervix    Negative HPV Negative    Hormone disorder    Hyperlipidemia    Hypotension    Pt reports low BP & dizziness w/ current pregnancy    Migraine headache without aura    Migraines    Osteoarthritis    PONV (postoperative nausea and vomiting)    nausea    Stroke (HCC)    Unspecified hypothyroidism    Urinary incontinence    Vertigo    Visual impairment    glasses   [2]   Past Surgical  History:  Procedure Laterality Date    Abdominal surgery  2018    Abdominoplasty Bilateral 2018    Hernia Repair & Rectus Diastasis      2013    Postpartum hemorrhage & endometritis    D & c      Hernia surgery      Other      LAPROMOTMY AFTER LAST     Prior myomectomy  2024    Excision, endometrial lesion: -Fragments of benign secretory endometrium. -Few fragments of benign myometrial smooth muscle.    Tubal ligation  2016   [3]   Family History  Problem Relation Age of Onset    Diabetes Mother     Hypertension Mother     Lipids Mother     Cancer Father         Liver    Hypertension Father     Lipids Father     Other (hepatitis C) Sister     Kidney Disease Maternal Grandmother     Diabetes Maternal Grandmother     Hypertension Maternal Grandfather     Diabetes Maternal Grandfather     Hypertension Paternal Grandmother     Diabetes Paternal Grandmother     Hypertension Paternal Grandfather     No Known Problems Daughter     No Known Problems Daughter     No Known Problems Son    [4]   Current Outpatient Medications:     Omeprazole 40 MG Oral Capsule Delayed Release, , Disp: , Rfl:     levothyroxine 75 MCG Oral Tab, Take 1 tablet (75 mcg total) by mouth before breakfast., Disp: , Rfl:     ketoconazole 2 % External Shampoo, twice a week., Disp: , Rfl:     Albuterol-Budesonide 90-80 MCG/ACT Inhalation Aerosol, daily as needed., Disp: , Rfl:     ADVAIR DISKUS 250-50 MCG/DOSE Inhalation Aerosol Powder, Breath Activated, Inhale 1 puff into the lungs 2 (two) times daily., Disp: , Rfl:   [5]   Allergies  Allergen Reactions    Aspirin SWELLING and PALPITATIONS    Naproxen OTHER (SEE COMMENTS)     GI upset, acidity, unable to eat for 3-4 days

## 2025-07-29 LAB — HPV E6+E7 MRNA CVX QL NAA+PROBE: NEGATIVE

## 2025-07-31 ENCOUNTER — ULTRASOUND ENCOUNTER (OUTPATIENT)
Facility: CLINIC | Age: 45
End: 2025-07-31
Payer: COMMERCIAL

## (undated) DEVICE — SPECIMEN SOCK - STANDARD: Brand: MEDI-VAC

## (undated) DEVICE — SOFT TISSUE SHAVER MINI: Brand: TRUCLEAR

## (undated) DEVICE — PACK GYNE CUSTOM

## (undated) DEVICE — SOLUTION IRRIG 3000ML 0.9% NACL FLX CONT

## (undated) DEVICE — 1010 S-DRAPE TOWEL DRAPE 10/BX: Brand: STERI-DRAPE™

## (undated) DEVICE — PROXIMATE RH ROTATING HEAD SKIN STAPLERS (35 WIDE) CONTAINS 35 STAINLESS STEEL STAPLES: Brand: PROXIMATE

## (undated) DEVICE — TOWEL: OR BLU 80/CS: Brand: MEDICAL ACTION INDUSTRIES

## (undated) DEVICE — CAUTERY BLADE 2IN INS E1455

## (undated) DEVICE — 2000CC GUARDIAN II: Brand: GUARDIAN

## (undated) DEVICE — CSTM UNIVERSAL DRAPE PK: Brand: MEDLINE INDUSTRIES, INC.

## (undated) DEVICE — MARKER SKIN 2 TIP

## (undated) DEVICE — GOWN,SIRUS,FABRIC-REINFORCED,X-LARGE: Brand: MEDLINE

## (undated) DEVICE — GLOVE BIOGEL M SURG SZ 71/2

## (undated) DEVICE — HYSTEROSCOPIC OUTFLOW TUBE SET

## (undated) DEVICE — SUTURE MONOCRYL 4-0 PS-2

## (undated) DEVICE — SUTURE ETHIBOND EXCEL 0 CT-2

## (undated) DEVICE — Device

## (undated) DEVICE — LIGACLIP MCA MULTIPLE CLIP APPLIERS, 30 MEDIUM CLIPS: Brand: LIGACLIP

## (undated) DEVICE — BANDAGE ROLL,100% COTTON, 6 PLY, LARGE: Brand: KERLIX

## (undated) DEVICE — PREMIUM WET SKIN PREP TRAY: Brand: MEDLINE INDUSTRIES, INC.

## (undated) DEVICE — 3M™ IOBAN™ 2 ANTIMICROBIAL INCISE DRAPE 6648EZ: Brand: IOBAN™ 2

## (undated) DEVICE — KENDALL SCD EXPRESS SLEEVES, KNEE LENGTH, MEDIUM: Brand: KENDALL SCD

## (undated) DEVICE — DRAIN RELIAVAC 100CC

## (undated) DEVICE — DERMABOND LIQUID ADHESIVE

## (undated) DEVICE — SUTURE VICRYL 0 CT-1

## (undated) DEVICE — MEDI-VAC YANKAUER SUCTION HANDLE W/BULBOUS TIP: Brand: CARDINAL HEALTH

## (undated) DEVICE — SLEEVE COMPR MD KNEE LEN SGL USE KENDALL SCD

## (undated) DEVICE — CHLORAPREP 26ML APPLICATOR

## (undated) DEVICE — 3M™ STERI-STRIP™ REINFORCED ADHESIVE SKIN CLOSURES, R1548, 1 IN X 5 IN (25 MM X 125 MM), 4 STRIPS/ENVELOPE: Brand: 3M™ STERI-STRIP™

## (undated) DEVICE — SET TB INFLO FOR TRUCLEAR SYS HYSTEROLUX

## (undated) DEVICE — DRAIN BLAKE ROUND 15FR

## (undated) DEVICE — SUTURE VICRYL 3-0 SH

## (undated) DEVICE — SUTURE PROLENE 4-0 PS-2

## (undated) DEVICE — LAPAROTOMY CDS: Brand: MEDLINE INDUSTRIES, INC.

## (undated) DEVICE — GLOVE SURG SENSICARE SZ 7-1/2

## (undated) DEVICE — SOL  .9 1000ML BTL

## (undated) DEVICE — DRESSING BIOPATCH 1X4 CNTR

## (undated) DEVICE — GLOVE SUR 6.5 SENSICARE PI PIP CRM PWD F

## (undated) DEVICE — ELITE HYSTEROSCOPE SEAL: Brand: TRUCLEAR

## (undated) DEVICE — SUTURE SILK 3-0 SH

## (undated) DEVICE — REM POLYHESIVE ADULT PATIENT RETURN ELECTRODE: Brand: VALLEYLAB

## (undated) DEVICE — PIN SAFETY STERILE (2/PK)

## (undated) DEVICE — VIOLET BRAIDED (POLYGLACTIN 910), SYNTHETIC ABSORBABLE SUTURE: Brand: COATED VICRYL

## (undated) DEVICE — MEDI-VAC NON-CONDUCTIVE SUCTION TUBING: Brand: CARDINAL HEALTH

## (undated) NOTE — Clinical Note
I had the pleasure of seeing Cally Caldera on 4/25/2018. Please see my attached note.   Margarita Byrne MD FACS [facility]--Surgery

## (undated) NOTE — Clinical Note
I had the pleasure of seeing Marina Quintana on 5/11/2020. Please see my attached note. Tyler Ryder MD FACSEMG--Surgery

## (undated) NOTE — MR AVS SNAPSHOT
[facility] [place]  [place],  [address]  [address] [place] [address] [phone]               Thank you for choosing us for your health care visit with [id number], DO.   We are glad to serve you and happy to provide you with this sum Comment:  May see any provider in office      Referral Orders      Normal Orders This Visit    CHIROPRACTIC  - INTERNAL [[id number] CUSTOM]  Order #:  [id number]         **REFERRAL REQUEST**    Your physician has referred you to a specialist.  Your physician prenatal multivitamin plus DHA 27-0.8-228 MG Caps   Take 1 capsule by mouth daily. TraMADol HCl 50 MG Tabs   Take 1 tablet (50 mg total) by mouth every 6 (six) hours as needed.    Commonly known as:  ULTRAM                   MyChart     Call the

## (undated) NOTE — MR AVS SNAPSHOT
After Visit Summary   2/18/2020    Sukhjinder Prince    MRN: [id number]           Visit Information     Date & Time  2/18/2020  8:30 AM Provider  Magaly Gipson [place] [address]  Dept.  Phone  [phone]      Your OBED ABNER 2D+3D DIAGNOSTIC OBED LEFT (CPT=77065/93916)    Instructions: To schedule an appointment for your radiology test please call Aj Martinez Scheduling at [phone].                   [facility] now offers Video Visits through [address] for adult Also available by appointment     Average cost  $70*   [address]   Monday – Friday  10:00 am – 10:00 pm   Saturday – Sunday  10:00 am – 4:00 pm     [employer]   Monday – Fri

## (undated) NOTE — Clinical Note
Hi, Dr. Srinath Noland! Thank you for referring Mrs. Aida Lopez for rheumatologic evaluation. Please see the discussion portion of my note and let me know if you have any questions.  PRAVEEN Verdin Rheumatology5/1/2019

## (undated) NOTE — Clinical Note
Please let pt know that I reviewed labs from her PCP, will recheck inflammatory markers as well as a few other labs to evaluate her pain. She should get xrays done as well. I will call her with results and next recommendations based off results.  Yahaira Sage